# Patient Record
Sex: FEMALE | Race: WHITE | Employment: PART TIME | URBAN - METROPOLITAN AREA
[De-identification: names, ages, dates, MRNs, and addresses within clinical notes are randomized per-mention and may not be internally consistent; named-entity substitution may affect disease eponyms.]

---

## 2017-01-23 ENCOUNTER — OFFICE VISIT (OUTPATIENT)
Dept: FAMILY MEDICINE CLINIC | Age: 22
End: 2017-01-23

## 2017-01-23 VITALS
DIASTOLIC BLOOD PRESSURE: 70 MMHG | WEIGHT: 204.4 LBS | HEIGHT: 62 IN | TEMPERATURE: 97.2 F | BODY MASS INDEX: 37.61 KG/M2 | RESPIRATION RATE: 16 BRPM | HEART RATE: 68 BPM | SYSTOLIC BLOOD PRESSURE: 100 MMHG

## 2017-01-23 DIAGNOSIS — Z23 NEED FOR INFLUENZA VACCINATION: ICD-10-CM

## 2017-01-23 DIAGNOSIS — J03.90 TONSILLITIS: ICD-10-CM

## 2017-01-23 DIAGNOSIS — J01.01 ACUTE RECURRENT MAXILLARY SINUSITIS: Primary | ICD-10-CM

## 2017-01-23 PROCEDURE — G8419 CALC BMI OUT NRM PARAM NOF/U: HCPCS | Performed by: FAMILY MEDICINE

## 2017-01-23 PROCEDURE — G8427 DOCREV CUR MEDS BY ELIG CLIN: HCPCS | Performed by: FAMILY MEDICINE

## 2017-01-23 PROCEDURE — G8484 FLU IMMUNIZE NO ADMIN: HCPCS | Performed by: FAMILY MEDICINE

## 2017-01-23 PROCEDURE — 1036F TOBACCO NON-USER: CPT | Performed by: FAMILY MEDICINE

## 2017-01-23 PROCEDURE — 99213 OFFICE O/P EST LOW 20 MIN: CPT | Performed by: FAMILY MEDICINE

## 2017-01-23 RX ORDER — CEFDINIR 300 MG/1
600 CAPSULE ORAL DAILY
Qty: 20 CAPSULE | Refills: 0 | Status: SHIPPED | OUTPATIENT
Start: 2017-01-23 | End: 2017-02-02

## 2017-01-23 ASSESSMENT — ENCOUNTER SYMPTOMS
COUGH: 0
VOMITING: 0
EYES NEGATIVE: 1
NAUSEA: 0
DIARRHEA: 0
SHORTNESS OF BREATH: 1
CONSTIPATION: 0
ABDOMINAL PAIN: 0

## 2017-01-23 ASSESSMENT — PATIENT HEALTH QUESTIONNAIRE - PHQ9
2. FEELING DOWN, DEPRESSED OR HOPELESS: 0
1. LITTLE INTEREST OR PLEASURE IN DOING THINGS: 0
SUM OF ALL RESPONSES TO PHQ9 QUESTIONS 1 & 2: 0
SUM OF ALL RESPONSES TO PHQ QUESTIONS 1-9: 0

## 2017-04-06 ENCOUNTER — PROCEDURE VISIT (OUTPATIENT)
Dept: OBGYN | Age: 22
End: 2017-04-06

## 2017-04-06 VITALS
DIASTOLIC BLOOD PRESSURE: 78 MMHG | BODY MASS INDEX: 35.33 KG/M2 | HEIGHT: 62 IN | WEIGHT: 192 LBS | HEART RATE: 85 BPM | SYSTOLIC BLOOD PRESSURE: 116 MMHG

## 2017-04-06 DIAGNOSIS — Z11.3 ROUTINE SCREENING FOR STI (SEXUALLY TRANSMITTED INFECTION): ICD-10-CM

## 2017-04-06 DIAGNOSIS — N92.6 IRREGULAR PERIODS: Primary | ICD-10-CM

## 2017-04-06 DIAGNOSIS — Z30.431 IUD CHECK UP: ICD-10-CM

## 2017-04-06 PROCEDURE — 1036F TOBACCO NON-USER: CPT | Performed by: OBSTETRICS & GYNECOLOGY

## 2017-04-06 PROCEDURE — 58300 INSERT INTRAUTERINE DEVICE: CPT | Performed by: OBSTETRICS & GYNECOLOGY

## 2017-04-12 DIAGNOSIS — Z11.3 ROUTINE SCREENING FOR STI (SEXUALLY TRANSMITTED INFECTION): ICD-10-CM

## 2017-04-17 ENCOUNTER — TELEPHONE (OUTPATIENT)
Dept: OBGYN | Age: 22
End: 2017-04-17

## 2017-04-17 RX ORDER — AZITHROMYCIN 500 MG/1
TABLET, FILM COATED ORAL
Qty: 2 TABLET | Refills: 0 | Status: SHIPPED | OUTPATIENT
Start: 2017-04-17 | End: 2017-05-15

## 2017-04-19 ENCOUNTER — TELEPHONE (OUTPATIENT)
Dept: OBGYN | Age: 22
End: 2017-04-19

## 2017-04-19 ENCOUNTER — HOSPITAL ENCOUNTER (OUTPATIENT)
Dept: ULTRASOUND IMAGING | Age: 22
Discharge: HOME | End: 2017-04-19
Payer: COMMERCIAL

## 2017-04-19 DIAGNOSIS — Z30.431 IUD CHECK UP: ICD-10-CM

## 2017-04-19 PROCEDURE — 76856 US EXAM PELVIC COMPLETE: CPT

## 2017-04-19 PROCEDURE — 76830 TRANSVAGINAL US NON-OB: CPT

## 2017-04-27 ENCOUNTER — PROCEDURE VISIT (OUTPATIENT)
Dept: OBGYN | Age: 22
End: 2017-04-27

## 2017-04-27 VITALS
DIASTOLIC BLOOD PRESSURE: 72 MMHG | SYSTOLIC BLOOD PRESSURE: 124 MMHG | HEIGHT: 62 IN | BODY MASS INDEX: 35.88 KG/M2 | WEIGHT: 195 LBS

## 2017-04-27 DIAGNOSIS — Z20.2 STD EXPOSURE: ICD-10-CM

## 2017-04-27 DIAGNOSIS — Z30.432 ENCOUNTER FOR IUD REMOVAL: Primary | ICD-10-CM

## 2017-04-27 PROCEDURE — 99212 OFFICE O/P EST SF 10 MIN: CPT | Performed by: OBSTETRICS & GYNECOLOGY

## 2017-04-27 PROCEDURE — 1036F TOBACCO NON-USER: CPT | Performed by: OBSTETRICS & GYNECOLOGY

## 2017-04-27 PROCEDURE — 58301 REMOVE INTRAUTERINE DEVICE: CPT | Performed by: OBSTETRICS & GYNECOLOGY

## 2017-05-02 DIAGNOSIS — Z20.2 STD EXPOSURE: Primary | ICD-10-CM

## 2017-05-02 DIAGNOSIS — Z20.2 STD EXPOSURE: ICD-10-CM

## 2017-05-02 LAB
HAV IGM SER IA-ACNC: NORMAL
HEPATITIS B CORE IGM ANTIBODY: NORMAL
HEPATITIS B SURFACE ANTIGEN INTERPRETATION: NORMAL
HEPATITIS C ANTIBODY INTERPRETATION: NORMAL
HEPATITIS INTERPRETATION:: NORMAL

## 2017-05-03 LAB — RPR: NORMAL

## 2017-05-04 ENCOUNTER — TELEPHONE (OUTPATIENT)
Dept: OBGYN | Age: 22
End: 2017-05-04

## 2017-05-04 LAB
HERPES TYPE 1/2 IGM COMBINED: 0.48 IV
HERPES TYPE I/II IGG COMBINED: 0.3 IV
HIV-1 AND HIV-2 ANTIBODIES: NEGATIVE

## 2017-05-15 ENCOUNTER — OFFICE VISIT (OUTPATIENT)
Dept: OBGYN | Age: 22
End: 2017-05-15

## 2017-05-15 VITALS
DIASTOLIC BLOOD PRESSURE: 74 MMHG | HEIGHT: 62 IN | BODY MASS INDEX: 35.88 KG/M2 | WEIGHT: 195 LBS | SYSTOLIC BLOOD PRESSURE: 102 MMHG

## 2017-05-15 DIAGNOSIS — Z20.2 EXPOSURE TO STD: Primary | ICD-10-CM

## 2017-05-15 PROCEDURE — G8427 DOCREV CUR MEDS BY ELIG CLIN: HCPCS | Performed by: OBSTETRICS & GYNECOLOGY

## 2017-05-15 PROCEDURE — 99213 OFFICE O/P EST LOW 20 MIN: CPT | Performed by: OBSTETRICS & GYNECOLOGY

## 2017-05-15 PROCEDURE — G8417 CALC BMI ABV UP PARAM F/U: HCPCS | Performed by: OBSTETRICS & GYNECOLOGY

## 2017-05-15 PROCEDURE — 1036F TOBACCO NON-USER: CPT | Performed by: OBSTETRICS & GYNECOLOGY

## 2017-05-15 RX ORDER — SPIRONOLACTONE 25 MG/1
50 TABLET ORAL DAILY
Qty: 30 TABLET | Refills: 3 | Status: SHIPPED | OUTPATIENT
Start: 2017-05-15 | End: 2017-05-19 | Stop reason: SDUPTHER

## 2017-05-15 RX ORDER — METFORMIN HYDROCHLORIDE 500 MG/1
500 TABLET, FILM COATED, EXTENDED RELEASE ORAL 2 TIMES DAILY WITH MEALS
Qty: 60 TABLET | Refills: 3 | Status: SHIPPED | OUTPATIENT
Start: 2017-05-15 | End: 2017-05-19 | Stop reason: SDUPTHER

## 2017-05-17 ENCOUNTER — TELEPHONE (OUTPATIENT)
Dept: OBGYN | Age: 22
End: 2017-05-17

## 2017-05-18 ENCOUNTER — TELEPHONE (OUTPATIENT)
Dept: OBGYN | Age: 22
End: 2017-05-18

## 2017-05-19 ENCOUNTER — TELEPHONE (OUTPATIENT)
Dept: OBGYN | Age: 22
End: 2017-05-19

## 2017-05-19 RX ORDER — METFORMIN HYDROCHLORIDE 500 MG/1
500 TABLET, FILM COATED, EXTENDED RELEASE ORAL 2 TIMES DAILY WITH MEALS
Qty: 180 TABLET | Refills: 3 | Status: SHIPPED | OUTPATIENT
Start: 2017-05-19 | End: 2017-06-21 | Stop reason: ALTCHOICE

## 2017-05-19 RX ORDER — SPIRONOLACTONE 25 MG/1
50 TABLET ORAL DAILY
Qty: 180 TABLET | Refills: 3 | Status: SHIPPED | OUTPATIENT
Start: 2017-05-19 | End: 2018-05-28 | Stop reason: SDUPTHER

## 2017-05-19 RX ORDER — SPIRONOLACTONE 25 MG/1
50 TABLET ORAL DAILY
Qty: 90 TABLET | Refills: 3 | Status: SHIPPED | OUTPATIENT
Start: 2017-05-19 | End: 2017-06-20

## 2017-05-24 DIAGNOSIS — Z20.2 EXPOSURE TO STD: ICD-10-CM

## 2017-05-26 ENCOUNTER — TELEPHONE (OUTPATIENT)
Dept: OBGYN | Age: 22
End: 2017-05-26

## 2017-06-07 ENCOUNTER — TELEPHONE (OUTPATIENT)
Dept: OBGYN | Age: 22
End: 2017-06-07

## 2017-06-20 ENCOUNTER — TELEPHONE (OUTPATIENT)
Dept: OBGYN | Age: 22
End: 2017-06-20

## 2017-06-20 RX ORDER — METFORMIN HYDROCHLORIDE 500 MG/1
500 TABLET, EXTENDED RELEASE ORAL 2 TIMES DAILY WITH MEALS
Qty: 60 TABLET | Refills: 3 | Status: SHIPPED | OUTPATIENT
Start: 2017-06-20 | End: 2017-06-23 | Stop reason: SDUPTHER

## 2017-06-21 ENCOUNTER — OFFICE VISIT (OUTPATIENT)
Dept: FAMILY MEDICINE CLINIC | Age: 22
End: 2017-06-21

## 2017-06-21 VITALS
WEIGHT: 196 LBS | BODY MASS INDEX: 36.07 KG/M2 | HEIGHT: 62 IN | HEART RATE: 96 BPM | TEMPERATURE: 97 F | DIASTOLIC BLOOD PRESSURE: 82 MMHG | SYSTOLIC BLOOD PRESSURE: 110 MMHG

## 2017-06-21 DIAGNOSIS — H92.01 OTALGIA, RIGHT: Primary | ICD-10-CM

## 2017-06-21 DIAGNOSIS — H61.21 IMPACTED CERUMEN OF RIGHT EAR: ICD-10-CM

## 2017-06-21 PROCEDURE — G8417 CALC BMI ABV UP PARAM F/U: HCPCS | Performed by: FAMILY MEDICINE

## 2017-06-21 PROCEDURE — 1036F TOBACCO NON-USER: CPT | Performed by: FAMILY MEDICINE

## 2017-06-21 PROCEDURE — G8427 DOCREV CUR MEDS BY ELIG CLIN: HCPCS | Performed by: FAMILY MEDICINE

## 2017-06-21 PROCEDURE — 99213 OFFICE O/P EST LOW 20 MIN: CPT | Performed by: FAMILY MEDICINE

## 2017-06-26 RX ORDER — METFORMIN HYDROCHLORIDE 500 MG/1
500 TABLET, EXTENDED RELEASE ORAL 2 TIMES DAILY WITH MEALS
Qty: 180 TABLET | Refills: 3 | Status: SHIPPED | OUTPATIENT
Start: 2017-06-26 | End: 2018-04-13 | Stop reason: SDUPTHER

## 2017-08-14 ENCOUNTER — PROCEDURE VISIT (OUTPATIENT)
Dept: OBGYN | Age: 22
End: 2017-08-14

## 2017-08-14 VITALS
SYSTOLIC BLOOD PRESSURE: 108 MMHG | HEIGHT: 62 IN | BODY MASS INDEX: 35.33 KG/M2 | WEIGHT: 192 LBS | DIASTOLIC BLOOD PRESSURE: 80 MMHG

## 2017-08-14 DIAGNOSIS — Z30.431 IUD CHECK UP: ICD-10-CM

## 2017-08-14 DIAGNOSIS — N92.6 IRREGULAR PERIODS: ICD-10-CM

## 2017-08-14 DIAGNOSIS — Z30.430 ENCOUNTER FOR IUD INSERTION: Primary | ICD-10-CM

## 2017-08-14 LAB
CONTROL: NORMAL
PREGNANCY TEST URINE, POC: NEGATIVE

## 2017-08-14 PROCEDURE — 58300 INSERT INTRAUTERINE DEVICE: CPT | Performed by: OBSTETRICS & GYNECOLOGY

## 2017-08-14 PROCEDURE — 1036F TOBACCO NON-USER: CPT | Performed by: OBSTETRICS & GYNECOLOGY

## 2017-08-14 PROCEDURE — 81025 URINE PREGNANCY TEST: CPT | Performed by: OBSTETRICS & GYNECOLOGY

## 2017-09-25 ENCOUNTER — TELEPHONE (OUTPATIENT)
Dept: OBGYN | Age: 22
End: 2017-09-25

## 2017-09-27 ENCOUNTER — HOSPITAL ENCOUNTER (OUTPATIENT)
Dept: ULTRASOUND IMAGING | Age: 22
Discharge: HOME | End: 2017-09-27
Payer: COMMERCIAL

## 2017-09-27 DIAGNOSIS — Z30.431 IUD CHECK UP: ICD-10-CM

## 2017-09-27 PROCEDURE — 76856 US EXAM PELVIC COMPLETE: CPT

## 2017-09-27 PROCEDURE — 76830 TRANSVAGINAL US NON-OB: CPT

## 2017-10-04 ENCOUNTER — TELEPHONE (OUTPATIENT)
Dept: OBGYN | Age: 22
End: 2017-10-04

## 2017-10-04 DIAGNOSIS — Z30.431 IUD CHECK UP: Primary | ICD-10-CM

## 2017-10-04 NOTE — TELEPHONE ENCOUNTER
Patient calling for ultrasound results. Patient informed the doctor will review at her next appointment on 10/10/17.

## 2017-10-10 ENCOUNTER — OFFICE VISIT (OUTPATIENT)
Dept: OBGYN | Age: 22
End: 2017-10-10

## 2017-10-10 VITALS — WEIGHT: 201 LBS | BODY MASS INDEX: 36.99 KG/M2 | HEIGHT: 62 IN

## 2017-10-10 DIAGNOSIS — Z30.431 IUD CHECK UP: Primary | ICD-10-CM

## 2017-10-10 PROCEDURE — G8427 DOCREV CUR MEDS BY ELIG CLIN: HCPCS | Performed by: OBSTETRICS & GYNECOLOGY

## 2017-10-10 PROCEDURE — G8417 CALC BMI ABV UP PARAM F/U: HCPCS | Performed by: OBSTETRICS & GYNECOLOGY

## 2017-10-10 PROCEDURE — 99213 OFFICE O/P EST LOW 20 MIN: CPT | Performed by: OBSTETRICS & GYNECOLOGY

## 2017-10-10 PROCEDURE — 1036F TOBACCO NON-USER: CPT | Performed by: OBSTETRICS & GYNECOLOGY

## 2017-10-10 PROCEDURE — G8484 FLU IMMUNIZE NO ADMIN: HCPCS | Performed by: OBSTETRICS & GYNECOLOGY

## 2017-10-10 NOTE — PROGRESS NOTES
SUBJECTIVE:   24 y.o. Amy Frank  female here to discuss results. Pt without complaints today. Review of Systems:  General ROS: negative  Respiratory ROS: no cough, shortness of breath, or wheezing  Cardiovascular ROS: no chest pain or dyspnea on exertion  Gastrointestinal ROS: no abdominal pain, change in bowel habits, or black or bloody stools  Genito-Urinary ROS: no dysuria, trouble voiding, or hematuria    OBJECTIVE:   Ht 5' 2\" (1.575 m)   Wt 201 lb (91.2 kg)   LMP 09/09/2017   BMI 36.76 kg/m²     Past Medical History:   Diagnosis Date    Obesity     PCOS (polycystic ovarian syndrome)                                                                    No past surgical history on file. Family History   Problem Relation Age of Onset    Other Father     Cancer Maternal Grandfather      esophageal    Breast Cancer Maternal Aunt      Social History     Social History    Marital status: Single     Spouse name: N/A    Number of children: N/A    Years of education: N/A     Occupational History    Not on file. Social History Main Topics    Smoking status: Never Smoker    Smokeless tobacco: Never Used    Alcohol use Yes    Drug use: No    Sexual activity: Yes     Partners: Male     Other Topics Concern    Not on file     Social History Narrative    No narrative on file       Physical Exam:  GEN: She appears well, afebrile. HEENT: normal cephalic, atraumatic  CVS: regular rate and rhythm  ABDOMEN: benign, soft, nontender, no masses. MUSCULOSKELETAL: normal gait, no masses  SKIN: normal texture and tone, no lesions    ASSESSMENT:   1. IUD check up     2. IUD is in the lower uterine segment   3. COUNSELED ON POTENTIAL DECR EFFECTIVENESS OF IUD  4. RTO FOR REMOVAL AT HER DESIRE/PRECAUTIONS GIVEN    PLAN:   Risks, benefits and alternative therapies for treatment discussed. Pt elects to have keep IUD in place at this time.  Will return to office in 1 month, will call back if she chooses a different

## 2017-10-25 ENCOUNTER — OFFICE VISIT (OUTPATIENT)
Dept: FAMILY MEDICINE CLINIC | Age: 22
End: 2017-10-25

## 2017-10-25 VITALS
RESPIRATION RATE: 12 BRPM | OXYGEN SATURATION: 96 % | SYSTOLIC BLOOD PRESSURE: 104 MMHG | TEMPERATURE: 96.4 F | WEIGHT: 197.19 LBS | DIASTOLIC BLOOD PRESSURE: 80 MMHG | HEIGHT: 62 IN | BODY MASS INDEX: 36.29 KG/M2 | HEART RATE: 82 BPM

## 2017-10-25 DIAGNOSIS — E28.2 PCOD (POLYCYSTIC OVARIAN DISEASE): Primary | ICD-10-CM

## 2017-10-25 DIAGNOSIS — N83.209 CYST OF OVARY, UNSPECIFIED LATERALITY: ICD-10-CM

## 2017-10-25 DIAGNOSIS — E66.9 CLASS 2 OBESITY WITH BODY MASS INDEX (BMI) OF 36.0 TO 36.9 IN ADULT, UNSPECIFIED OBESITY TYPE, UNSPECIFIED WHETHER SERIOUS COMORBIDITY PRESENT: ICD-10-CM

## 2017-10-25 PROCEDURE — 99213 OFFICE O/P EST LOW 20 MIN: CPT | Performed by: FAMILY MEDICINE

## 2017-10-25 PROCEDURE — 1036F TOBACCO NON-USER: CPT | Performed by: FAMILY MEDICINE

## 2017-10-25 PROCEDURE — G8417 CALC BMI ABV UP PARAM F/U: HCPCS | Performed by: FAMILY MEDICINE

## 2017-10-25 PROCEDURE — G8427 DOCREV CUR MEDS BY ELIG CLIN: HCPCS | Performed by: FAMILY MEDICINE

## 2017-10-25 PROCEDURE — G8484 FLU IMMUNIZE NO ADMIN: HCPCS | Performed by: FAMILY MEDICINE

## 2017-10-25 RX ORDER — PHENTERMINE HYDROCHLORIDE 37.5 MG/1
37.5 CAPSULE ORAL EVERY MORNING
Qty: 30 CAPSULE | Refills: 0 | Status: SHIPPED | OUTPATIENT
Start: 2017-10-25 | End: 2017-11-24 | Stop reason: SDUPTHER

## 2017-10-25 NOTE — PROGRESS NOTES
Subjective:      Patient ID: Luis Daniel Devine is a 24 y.o. female. Chief Complaint   Patient presents with    Obesity     Would like to lose about 50 pounds. Has never been able to lose more than 10 pounds. Has tried exercising and self directed dieting / calorie counting without success.  Ovarian Cyst       HPI  She is here today follow-up on her ovarian cysts in her weight gain      She is currently taking the metformin and Aldactone from Dr. Brittanie Calvillo which really helped her hair loss      But her periods are still at times painful has tried taking birth control pills but at times forgets is going to be looking at new implant that may work very well for weight loss is also key to her problem persists and discussed options with her she has been really trying stable from all sugary drinks and less carbohydrates                  Wt Readings from Last 3 Encounters:   10/25/17 197 lb 3 oz (89.4 kg)   10/10/17 201 lb (91.2 kg)   08/14/17 192 lb (87.1 kg)     Body mass index is 36.07 kg/m². No Known Allergies  Outpatient Encounter Prescriptions as of 10/25/2017   Medication Sig Dispense Refill    phentermine (ADIPEX-P) 37.5 MG capsule Take 1 capsule by mouth every morning 30 capsule 0    levonorgestrel (MIRENA, 52 MG,) IUD 52 mg 1 each by Intrauterine route once      metFORMIN (GLUCOPHAGE XR) 500 MG extended release tablet Take 1 tablet by mouth 2 times daily (with meals) 180 tablet 3    spironolactone (ALDACTONE) 25 MG tablet Take 2 tablets by mouth daily 180 tablet 3     Facility-Administered Encounter Medications as of 10/25/2017   Medication Dose Route Frequency Provider Last Rate Last Dose    Levonorgestrel [facility]) IUD 19.5 mg 1 each  1 each Intrauterine Once [address] [facility], [place]   1 each at 04/07/17 8084     Social History     Social History    Marital status: Single     Spouse name: N/A    Number of children: N/A    Years of education: N/A     Occupational History    Not on file.      Social comorbidity present     3. Cyst of ovary, unspecified laterality               Plan:        Orders Placed This Encounter   Medications    phentermine (ADIPEX-P) 37.5 MG capsule     Sig: Take 1 capsule by mouth every morning     Dispense:  30 capsule     Refill:  0     No orders of the defined types were placed in this encounter.           Health Maintenance Due   Topic Date Due    Chlamydia screen  11/01/2010    Flu vaccine (1) 09/01/2017        so we discussed Adipex-P and comes in next time we'll do an EKG    See us in 4 weeks  Controlled Substances Monitoring:              If anything worsens or changes please call us at once , follow-up in the office as planned,

## 2017-11-20 ENCOUNTER — OFFICE VISIT (OUTPATIENT)
Dept: OBGYN | Age: 22
End: 2017-11-20

## 2017-11-20 VITALS
WEIGHT: 187 LBS | BODY MASS INDEX: 34.41 KG/M2 | DIASTOLIC BLOOD PRESSURE: 76 MMHG | SYSTOLIC BLOOD PRESSURE: 112 MMHG | HEIGHT: 62 IN

## 2017-11-20 DIAGNOSIS — Z30.432 ENCOUNTER FOR IUD REMOVAL: Primary | ICD-10-CM

## 2017-11-20 PROCEDURE — 58301 REMOVE INTRAUTERINE DEVICE: CPT | Performed by: OBSTETRICS & GYNECOLOGY

## 2017-11-20 NOTE — PROGRESS NOTES
IUD Removal and Insert:   Zuri Chandler is a 20 y.o. female with  here for IUD removal Pt with negative UPT today. Procedure 1:  Mirena IUD strings identified as external OS and grasped securely as cephalad as possible with ring forceps. With consistent firm pull the IUD is removed easily intact and without difficulty. Patient tolerated procedure well. Complications: none    Pt to RTO for follow up     IMoon, am scribing for, and in the presence of, Dr Isaura Hassan. Electronically signed by: Moon Burgos 17 1:12 PM        I, Dr Isaura Hassan, personally performed the services described in this documentation, as scribed by Moon Burgos in my presence, and it is both accurate and complete.  Electronically signed by: Isaura Hassan MD 17 4:41 AM

## 2017-11-24 ENCOUNTER — OFFICE VISIT (OUTPATIENT)
Dept: FAMILY MEDICINE CLINIC | Age: 22
End: 2017-11-24

## 2017-11-24 VITALS
RESPIRATION RATE: 12 BRPM | TEMPERATURE: 97.2 F | HEART RATE: 117 BPM | OXYGEN SATURATION: 99 % | BODY MASS INDEX: 34.15 KG/M2 | SYSTOLIC BLOOD PRESSURE: 100 MMHG | HEIGHT: 62 IN | WEIGHT: 185.56 LBS | DIASTOLIC BLOOD PRESSURE: 78 MMHG

## 2017-11-24 DIAGNOSIS — K29.00 OTHER ACUTE GASTRITIS WITHOUT HEMORRHAGE: Primary | ICD-10-CM

## 2017-11-24 DIAGNOSIS — R11.0 NAUSEA: ICD-10-CM

## 2017-11-24 PROCEDURE — 99213 OFFICE O/P EST LOW 20 MIN: CPT | Performed by: FAMILY MEDICINE

## 2017-11-24 PROCEDURE — G8417 CALC BMI ABV UP PARAM F/U: HCPCS | Performed by: FAMILY MEDICINE

## 2017-11-24 PROCEDURE — 1036F TOBACCO NON-USER: CPT | Performed by: FAMILY MEDICINE

## 2017-11-24 PROCEDURE — G8427 DOCREV CUR MEDS BY ELIG CLIN: HCPCS | Performed by: FAMILY MEDICINE

## 2017-11-24 PROCEDURE — G8484 FLU IMMUNIZE NO ADMIN: HCPCS | Performed by: FAMILY MEDICINE

## 2017-11-24 RX ORDER — PHENTERMINE HYDROCHLORIDE 37.5 MG/1
37.5 CAPSULE ORAL EVERY MORNING
Qty: 30 CAPSULE | Refills: 0 | Status: SHIPPED | OUTPATIENT
Start: 2017-11-24 | End: 2017-12-15 | Stop reason: SDUPTHER

## 2017-11-24 NOTE — PROGRESS NOTES
Subjective:      Patient ID: Jailene Zahng is a 20 y.o. female. Chief Complaint   Patient presents with    Obesity     Has been on Adipex for 1 month. Has been watching her diet, but has not been exercising.  Emesis     since last night. Has had 12-15 episodes. Started off as contents of her stomach, but has changed to bile. no other symptoms.         HPI    Here today follow-up on her Adipex doing well without no racing heart no shortness breath or chest pain      But unfortunately she's had 12 capsules onset of vomiting no diarrhea she needs to much with the holiday but started getting a bile taste she had no other symptoms at that time      Did take some Pepto-Bismol Zofran that mom had at home and call things down for her      The pain did not read to the right upper or right lower quadrant discomfort and in general    Wt Readings from Last 3 Encounters:   11/24/17 185 lb 9 oz (84.2 kg)   11/20/17 187 lb (84.8 kg)   10/25/17 197 lb 3 oz (89.4 kg)       No Known Allergies  Outpatient Encounter Prescriptions as of 11/24/2017   Medication Sig Dispense Refill    phentermine (ADIPEX-P) 37.5 MG capsule Take 1 capsule by mouth every morning  BMI 33.94. 30 capsule 0    metFORMIN (GLUCOPHAGE XR) 500 MG extended release tablet Take 1 tablet by mouth 2 times daily (with meals) 180 tablet 3    spironolactone (ALDACTONE) 25 MG tablet Take 2 tablets by mouth daily 180 tablet 3    [DISCONTINUED] phentermine (ADIPEX-P) 37.5 MG capsule Take 1 capsule by mouth every morning 30 capsule 0    [DISCONTINUED] levonorgestrel (MIRENA, 52 MG,) IUD 52 mg 1 each by Intrauterine route once       Facility-Administered Encounter Medications as of 11/24/2017   Medication Dose Route Frequency Provider Last Rate Last Dose    Levonorgestrel [facility]) IUD 19.5 mg 1 each  1 each Intrauterine Once Tarsha Sam MD   1 each at 04/07/17 4040     Social History     Social History    Marital status: Single     Spouse name: N/A    Number of children: N/A    Years of education: N/A     Occupational History    Not on file. Social History Main Topics    Smoking status: Never Smoker    Smokeless tobacco: Never Used    Alcohol use Yes    Drug use: No    Sexual activity: Yes     Partners: Male     Other Topics Concern    Not on file     Social History Narrative    No narrative on file     Family History   Problem Relation Age of Onset    Other Father     Cancer Maternal Grandfather      esophageal    Breast Cancer Maternal Aunt      Past Medical History:   Diagnosis Date    Obesity     PCOS (polycystic ovarian syndrome)      No past surgical history on file. REVIEW OF SYSTEMS:   REVIEW OF SYSTEMS:   Patient seen today for exam.  Denies any problems with hearing, headaches or vision. Denies any shortness of breath, chest pain, nausea or vomiting. No black stool, no blood in the stool. No heartburn. Denies any problems with constipation or diarrhea either. No dysuria type symptoms. Objective:     /78 (Site: Left Arm, Position: Sitting, Cuff Size: Medium Adult)   Pulse 117   Temp 97.2 °F (36.2 °C) (Temporal)   Resp 12   Ht 5' 2\" (1.575 m)   Wt 185 lb 9 oz (84.2 kg)   SpO2 99%   BMI 33.94 kg/m²     Physical Exam      O:  Alert and active female in no acute distress  HEENT:  TMs clear. Pharynx neg. Nares clear, no drainage noted  Neck supple/ no adenopathy   HEART:  RRR without murmur/ no carotid bruits  LUNGS:  Clear to auscultation bilaterally, no wheeze or rhonchi noted  THYROID: neg masses or nodularity  ABDOMEN:  Soft x4. Bowel sounds positive. No masses or organomegaly,  Negative tenderness, guarding or rebound. EXTR:  Without edema./ good pulses bilat    Neurologic exam unremarkable. DTRs in upper and lower extremities within normal limits. Full strength noted    Skin- no lesions noted       Assessment:      1. Other acute gastritis without hemorrhage     2.  Nausea               Plan:

## 2017-11-29 ENCOUNTER — TELEPHONE (OUTPATIENT)
Dept: OBGYN | Age: 22
End: 2017-11-29

## 2017-12-13 ENCOUNTER — TELEPHONE (OUTPATIENT)
Dept: OBGYN | Age: 22
End: 2017-12-13

## 2017-12-13 NOTE — TELEPHONE ENCOUNTER
Called pt,no answer,left message on voicemail to call back. Pt may be made aware that able to schedule an appointment with Dr Zelaya tomorrow at 2:45 or 3pm. Thank You

## 2017-12-13 NOTE — TELEPHONE ENCOUNTER
Spoke with patient's mother Breanne Veronica, pts mother very upset that we have no availability. I offered patient appointment for tomorrow at [phone] on Dr. Cayden Uribe, patient mother declined this. Patient said we need to see her either Thurdays, Friday or Saturday. Explained to pts mother we have no office hours Saturday, doctor Joaquina Calle is in surgery today and Friday, offered her next Tuesday with Dr. Jeffrey Yang, pt declined states they will be in [facility]. Pt then said she would like to speak to Dr. Jeffrey Yang explained I will forward message.

## 2017-12-14 ENCOUNTER — PROCEDURE VISIT (OUTPATIENT)
Dept: OBGYN | Age: 22
End: 2017-12-14

## 2017-12-14 VITALS
BODY MASS INDEX: 33.88 KG/M2 | WEIGHT: 184.1 LBS | SYSTOLIC BLOOD PRESSURE: 98 MMHG | DIASTOLIC BLOOD PRESSURE: 74 MMHG | HEIGHT: 62 IN

## 2017-12-14 DIAGNOSIS — N92.6 IRREGULAR MENSES: Primary | ICD-10-CM

## 2017-12-14 LAB
CONTROL: NORMAL
PREGNANCY TEST URINE, POC: NEGATIVE

## 2017-12-14 PROCEDURE — 99999 PR OFFICE/OUTPT VISIT,PROCEDURE ONLY: CPT | Performed by: OBSTETRICS & GYNECOLOGY

## 2017-12-14 PROCEDURE — 11981 INSERTION DRUG DLVR IMPLANT: CPT | Performed by: OBSTETRICS & GYNECOLOGY

## 2017-12-14 PROCEDURE — 81025 URINE PREGNANCY TEST: CPT | Performed by: OBSTETRICS & GYNECOLOGY

## 2017-12-15 ENCOUNTER — OFFICE VISIT (OUTPATIENT)
Dept: FAMILY MEDICINE CLINIC | Age: 22
End: 2017-12-15

## 2017-12-15 VITALS
TEMPERATURE: 97.1 F | BODY MASS INDEX: 33.56 KG/M2 | RESPIRATION RATE: 12 BRPM | DIASTOLIC BLOOD PRESSURE: 80 MMHG | SYSTOLIC BLOOD PRESSURE: 102 MMHG | HEART RATE: 102 BPM | HEIGHT: 62 IN | OXYGEN SATURATION: 99 % | WEIGHT: 182.38 LBS

## 2017-12-15 DIAGNOSIS — E28.2 PCOD (POLYCYSTIC OVARIAN DISEASE): Primary | ICD-10-CM

## 2017-12-15 PROCEDURE — G8427 DOCREV CUR MEDS BY ELIG CLIN: HCPCS | Performed by: FAMILY MEDICINE

## 2017-12-15 PROCEDURE — 1036F TOBACCO NON-USER: CPT | Performed by: FAMILY MEDICINE

## 2017-12-15 PROCEDURE — G8417 CALC BMI ABV UP PARAM F/U: HCPCS | Performed by: FAMILY MEDICINE

## 2017-12-15 PROCEDURE — G8484 FLU IMMUNIZE NO ADMIN: HCPCS | Performed by: FAMILY MEDICINE

## 2017-12-15 PROCEDURE — 99213 OFFICE O/P EST LOW 20 MIN: CPT | Performed by: FAMILY MEDICINE

## 2017-12-15 RX ORDER — PHENTERMINE HYDROCHLORIDE 37.5 MG/1
37.5 CAPSULE ORAL EVERY MORNING
Qty: 30 CAPSULE | Refills: 0 | Status: SHIPPED | OUTPATIENT
Start: 2017-12-24 | End: 2018-01-23

## 2017-12-15 NOTE — PROGRESS NOTES
Extremities ; [place] use alcohol or drug mucosa positive pressure #Subjective:      Patient ID: Soumya Case is a 20 y.o. female. Chief Complaint   Patient presents with    Weight Management     Has been on Adipex for 2 months. Has also been trying to watch her diet. Has not exercised very much for the past few months. Her visit this month is 10 days early due to going on vacation next week    Cyst       HPI  Seen today for follow-up on her Adipex-P doing pretty well no shortness breath chest pain nausea vomiting seem to be tolerating medication well        Also her sister doing pretty well taking her metformin     seem to be doing okay just got Norplant implant in      Wt Readings from Last 3 Encounters:   12/15/17 182 lb 6 oz (82.7 kg)   12/14/17 184 lb 1.6 oz (83.5 kg)   11/24/17 185 lb 9 oz (84.2 kg)       No Known Allergies  Outpatient Encounter Prescriptions as of 12/15/2017   Medication Sig Dispense Refill    [START ON 12/24/2017] phentermine (ADIPEX-P) 37.5 MG capsule Take 1 capsule by mouth every morning  BMI 35.36. 30 capsule 0    metFORMIN (GLUCOPHAGE XR) 500 MG extended release tablet Take 1 tablet by mouth 2 times daily (with meals) 180 tablet 3    spironolactone (ALDACTONE) 25 MG tablet Take 2 tablets by mouth daily 180 tablet 3    [DISCONTINUED] phentermine (ADIPEX-P) 37.5 MG capsule Take 1 capsule by mouth every morning  BMI 33.94. 30 capsule 0     Facility-Administered Encounter Medications as of 12/15/2017   Medication Dose Route Frequency Provider Last Rate Last Dose    etonogestrel (NEXPLANON) implant 68 mg  68 mg Subdermal Once Meghann Khalil MD   68 mg at 12/14/17 1049    Levonorgestrel [facility]) IUD 19.5 mg 1 each  1 each Intrauterine Once Deepa Hernandez MD   1 each at 04/07/17 0665     Social History     Social History    Marital status: Single     Spouse name: N/A    Number of children: N/A    Years of education: N/A     Occupational History    Not on file.      Social

## 2018-03-15 ENCOUNTER — OFFICE VISIT (OUTPATIENT)
Dept: OBGYN CLINIC | Age: 23
End: 2018-03-15
Payer: COMMERCIAL

## 2018-03-15 VITALS
SYSTOLIC BLOOD PRESSURE: 118 MMHG | WEIGHT: 185 LBS | HEIGHT: 62 IN | BODY MASS INDEX: 34.04 KG/M2 | DIASTOLIC BLOOD PRESSURE: 84 MMHG

## 2018-03-15 DIAGNOSIS — Z30.46 ENCOUNTER FOR SURVEILLANCE OF NEXPLANON SUBDERMAL CONTRACEPTIVE: Primary | ICD-10-CM

## 2018-03-15 DIAGNOSIS — N92.6 IRREGULAR PERIODS: ICD-10-CM

## 2018-03-15 DIAGNOSIS — Z87.42 HISTORY OF PCOS: ICD-10-CM

## 2018-03-15 PROCEDURE — G8484 FLU IMMUNIZE NO ADMIN: HCPCS | Performed by: OBSTETRICS & GYNECOLOGY

## 2018-03-15 PROCEDURE — G8417 CALC BMI ABV UP PARAM F/U: HCPCS | Performed by: OBSTETRICS & GYNECOLOGY

## 2018-03-15 PROCEDURE — 1036F TOBACCO NON-USER: CPT | Performed by: OBSTETRICS & GYNECOLOGY

## 2018-03-15 PROCEDURE — G8427 DOCREV CUR MEDS BY ELIG CLIN: HCPCS | Performed by: OBSTETRICS & GYNECOLOGY

## 2018-03-15 PROCEDURE — 99212 OFFICE O/P EST SF 10 MIN: CPT | Performed by: OBSTETRICS & GYNECOLOGY

## 2018-03-15 RX ORDER — VALACYCLOVIR HYDROCHLORIDE 500 MG/1
TABLET, FILM COATED ORAL
Refills: 6 | COMMUNITY
Start: 2018-01-01 | End: 2018-08-01 | Stop reason: SDUPTHER

## 2018-03-19 ASSESSMENT — ENCOUNTER SYMPTOMS
ABDOMINAL PAIN: 0
CONSTIPATION: 0
DIARRHEA: 0
COUGH: 0
WHEEZING: 0
BLOOD IN STOOL: 0
SHORTNESS OF BREATH: 0

## 2018-03-19 NOTE — PROGRESS NOTES
behavior is normal. Judgment and thought content normal.         ASSESSMENT:      20 y.o. Annual  1. Encounter for surveillance of Nexplanon subdermal contraceptive     2. Irregular periods     3. History of PCOS  Marin Arreguin MD                    Counseling Completed:          PLAN:      Orders Placed This Encounter   Procedures   Marin Arreguin MD     Referral Priority:   Routine     Referral Type:   Consult for Advice and Opinion     Referral Reason:   Specialty Services Required     Referred to Provider:   Vaibhav Fuller MD     Requested Specialty:   Endocrinology     Number of Visits Requested:   1                   I, Lonie Hatchet, am scribing for, and in the presence of, Dr Edd Hickman. Electronically signed by: Dr Edd Jones, personally performed the services described in this documentation, as scribed by Lonie Hatchet in my presence, and it is both accurate and complete.  Electronically signed by: Edd Hickman MD

## 2018-04-13 ENCOUNTER — OFFICE VISIT (OUTPATIENT)
Dept: ENDOCRINOLOGY | Age: 23
End: 2018-04-13
Payer: COMMERCIAL

## 2018-04-13 VITALS
DIASTOLIC BLOOD PRESSURE: 80 MMHG | WEIGHT: 184 LBS | SYSTOLIC BLOOD PRESSURE: 112 MMHG | HEIGHT: 62 IN | HEART RATE: 74 BPM | BODY MASS INDEX: 33.86 KG/M2

## 2018-04-13 DIAGNOSIS — E28.2 PCOD (POLYCYSTIC OVARIAN DISEASE): Primary | ICD-10-CM

## 2018-04-13 DIAGNOSIS — E66.9 OBESITY (BMI 30-39.9): ICD-10-CM

## 2018-04-13 DIAGNOSIS — J32.9 SINUSITIS, UNSPECIFIED CHRONICITY, UNSPECIFIED LOCATION: ICD-10-CM

## 2018-04-13 DIAGNOSIS — E28.2 PCOD (POLYCYSTIC OVARIAN DISEASE): ICD-10-CM

## 2018-04-13 DIAGNOSIS — R63.5 WEIGHT GAIN: ICD-10-CM

## 2018-04-13 LAB
ANION GAP SERPL CALCULATED.3IONS-SCNC: 16 MEQ/L (ref 7–13)
BUN BLDV-MCNC: 11 MG/DL (ref 6–20)
CALCIUM SERPL-MCNC: 9.5 MG/DL (ref 8.6–10.2)
CHLORIDE BLD-SCNC: 101 MEQ/L (ref 98–107)
CO2: 25 MEQ/L (ref 22–29)
CREAT SERPL-MCNC: 0.56 MG/DL (ref 0.5–0.9)
GFR AFRICAN AMERICAN: >60
GFR NON-AFRICAN AMERICAN: >60
GLUCOSE BLD-MCNC: 75 MG/DL (ref 74–109)
HBA1C MFR BLD: 4.8 % (ref 4.8–5.9)
POTASSIUM SERPL-SCNC: 4.2 MEQ/L (ref 3.5–5.1)
SODIUM BLD-SCNC: 142 MEQ/L (ref 132–144)
T4 FREE: 1.22 NG/DL (ref 0.93–1.7)
TSH SERPL DL<=0.05 MIU/L-ACNC: 2.27 UIU/ML (ref 0.27–4.2)

## 2018-04-13 PROCEDURE — 1036F TOBACCO NON-USER: CPT | Performed by: INTERNAL MEDICINE

## 2018-04-13 PROCEDURE — G8427 DOCREV CUR MEDS BY ELIG CLIN: HCPCS | Performed by: INTERNAL MEDICINE

## 2018-04-13 PROCEDURE — G8417 CALC BMI ABV UP PARAM F/U: HCPCS | Performed by: INTERNAL MEDICINE

## 2018-04-13 PROCEDURE — 99203 OFFICE O/P NEW LOW 30 MIN: CPT | Performed by: INTERNAL MEDICINE

## 2018-04-13 RX ORDER — METFORMIN HYDROCHLORIDE 500 MG/1
TABLET, EXTENDED RELEASE ORAL
Qty: 120 TABLET | Refills: 3 | Status: SHIPPED | OUTPATIENT
Start: 2018-04-13 | End: 2018-08-31 | Stop reason: SDUPTHER

## 2018-04-13 RX ORDER — AZITHROMYCIN 250 MG/1
TABLET, FILM COATED ORAL
Qty: 1 PACKET | Refills: 0 | Status: SHIPPED | OUTPATIENT
Start: 2018-04-13 | End: 2018-04-23

## 2018-04-18 LAB — THYROID PEROXIDASE (TPO) ABS: <10 IU/ML (ref 0–35)

## 2018-04-21 PROBLEM — E66.9 OBESITY: Status: ACTIVE | Noted: 2018-04-21

## 2018-04-21 ASSESSMENT — ENCOUNTER SYMPTOMS
SINUS PAIN: 1
SINUS PRESSURE: 1

## 2018-05-09 ENCOUNTER — OFFICE VISIT (OUTPATIENT)
Dept: FAMILY MEDICINE CLINIC | Age: 23
End: 2018-05-09
Payer: COMMERCIAL

## 2018-05-09 VITALS
RESPIRATION RATE: 12 BRPM | HEIGHT: 62 IN | SYSTOLIC BLOOD PRESSURE: 110 MMHG | DIASTOLIC BLOOD PRESSURE: 70 MMHG | WEIGHT: 187.31 LBS | TEMPERATURE: 96.3 F | OXYGEN SATURATION: 98 % | HEART RATE: 84 BPM | BODY MASS INDEX: 34.47 KG/M2

## 2018-05-09 DIAGNOSIS — J03.91 RECURRENT TONSILLITIS: ICD-10-CM

## 2018-05-09 DIAGNOSIS — J03.01 ACUTE RECURRENT STREPTOCOCCAL TONSILLITIS: Primary | ICD-10-CM

## 2018-05-09 PROCEDURE — 99213 OFFICE O/P EST LOW 20 MIN: CPT | Performed by: FAMILY MEDICINE

## 2018-05-09 PROCEDURE — 1036F TOBACCO NON-USER: CPT | Performed by: FAMILY MEDICINE

## 2018-05-09 PROCEDURE — G8427 DOCREV CUR MEDS BY ELIG CLIN: HCPCS | Performed by: FAMILY MEDICINE

## 2018-05-09 PROCEDURE — G8417 CALC BMI ABV UP PARAM F/U: HCPCS | Performed by: FAMILY MEDICINE

## 2018-05-09 RX ORDER — METHYLPREDNISOLONE 4 MG/1
TABLET ORAL
Qty: 1 KIT | Refills: 0 | Status: SHIPPED | OUTPATIENT
Start: 2018-05-09 | End: 2018-05-15

## 2018-05-09 RX ORDER — CEFDINIR 300 MG/1
600 CAPSULE ORAL DAILY
Qty: 20 CAPSULE | Refills: 0 | Status: SHIPPED | OUTPATIENT
Start: 2018-05-09 | End: 2018-05-19

## 2018-05-09 ASSESSMENT — PATIENT HEALTH QUESTIONNAIRE - PHQ9
SUM OF ALL RESPONSES TO PHQ QUESTIONS 1-9: 0
2. FEELING DOWN, DEPRESSED OR HOPELESS: 0
1. LITTLE INTEREST OR PLEASURE IN DOING THINGS: 0
SUM OF ALL RESPONSES TO PHQ9 QUESTIONS 1 & 2: 0

## 2018-05-30 RX ORDER — SPIRONOLACTONE 25 MG/1
TABLET ORAL
Qty: 180 TABLET | Refills: 3 | Status: SHIPPED | OUTPATIENT
Start: 2018-05-30 | End: 2018-08-31 | Stop reason: SDUPTHER

## 2018-08-01 ENCOUNTER — OFFICE VISIT (OUTPATIENT)
Dept: FAMILY MEDICINE CLINIC | Age: 23
End: 2018-08-01
Payer: COMMERCIAL

## 2018-08-01 VITALS
RESPIRATION RATE: 20 BRPM | HEIGHT: 62 IN | BODY MASS INDEX: 37.17 KG/M2 | SYSTOLIC BLOOD PRESSURE: 114 MMHG | DIASTOLIC BLOOD PRESSURE: 72 MMHG | HEART RATE: 80 BPM | WEIGHT: 202 LBS | TEMPERATURE: 97.7 F

## 2018-08-01 DIAGNOSIS — E28.2 PCOS (POLYCYSTIC OVARIAN SYNDROME): Primary | ICD-10-CM

## 2018-08-01 DIAGNOSIS — R63.4 WEIGHT LOSS: ICD-10-CM

## 2018-08-01 PROCEDURE — 99212 OFFICE O/P EST SF 10 MIN: CPT | Performed by: FAMILY MEDICINE

## 2018-08-01 RX ORDER — PHENTERMINE HYDROCHLORIDE 37.5 MG/1
37.5 TABLET ORAL
Qty: 30 TABLET | Refills: 0 | Status: SHIPPED | OUTPATIENT
Start: 2018-08-01 | End: 2018-08-31 | Stop reason: SDUPTHER

## 2018-08-01 RX ORDER — VALACYCLOVIR HYDROCHLORIDE 500 MG/1
TABLET, FILM COATED ORAL
Qty: 8 TABLET | Refills: 5 | Status: SHIPPED | OUTPATIENT
Start: 2018-08-01 | End: 2018-08-31 | Stop reason: SDUPTHER

## 2018-08-01 ASSESSMENT — ENCOUNTER SYMPTOMS
SHORTNESS OF BREATH: 0
SORE THROAT: 0
DIARRHEA: 0
COUGH: 0
CONSTIPATION: 0
SINUS PRESSURE: 0
ABDOMINAL PAIN: 0
EYE ITCHING: 0
EYE DISCHARGE: 0

## 2018-08-01 NOTE — PROGRESS NOTES
 Breast Cancer Maternal Aunt      Past Medical History:   Diagnosis Date    Obesity     PCOS (polycystic ovarian syndrome)      No past surgical history on file. There were no vitals taken for this visit. No Known Allergies  Outpatient Encounter Prescriptions as of 8/1/2018   Medication Sig Dispense Refill    spironolactone (ALDACTONE) 25 MG tablet TAKE 2 TABLETS DAILY 180 tablet 3    metFORMIN (GLUCOPHAGE XR) 500 MG extended release tablet 2 po bid 120 tablet 3    valACYclovir (VALTREX) 500 MG tablet TAKE 4 TABLETS BY MOUTH AT THE FIRST SIGN OF ATTACK  THEN TAKE 4 TABLETS 12 HOURS LATER  6     Facility-Administered Encounter Medications as of 8/1/2018   Medication Dose Route Frequency Provider Last Rate Last Dose    etonogestrel (NEXPLANON) implant 68 mg  68 mg Subdermal Once Daniela Vieira MD   68 mg at 12/14/17 1049    Levonorgestrel [facility]) IUD 19.5 mg 1 each  1 each Intrauterine Once Erica Diaz MD   1 each at 04/07/17 7800     Social History     Social History    Marital status: Single     Spouse name: N/A    Number of children: N/A    Years of education: N/A     Occupational History    Not on file. Social History Main Topics    Smoking status: Never Smoker    Smokeless tobacco: Never Used    Alcohol use Yes    Drug use: No    Sexual activity: Yes     Partners: Male     Other Topics Concern    Not on file     Social History Narrative    No narrative on file     Family History   Problem Relation Age of Onset    Other Father     Cancer Maternal Grandfather         esophageal    Breast Cancer Maternal Aunt      Past Medical History:   Diagnosis Date    Obesity     PCOS (polycystic ovarian syndrome)      No past surgical history on file. Past Medical History:   Diagnosis Date    Obesity     PCOS (polycystic ovarian syndrome)      No past surgical history on file.   Social History     Social History    Marital status: Single     Spouse name: N/A    Number of children: N/A    Years of education: N/A     Occupational History    Not on file. Social History Main Topics    Smoking status: Never Smoker    Smokeless tobacco: Never Used    Alcohol use Yes    Drug use: No    Sexual activity: Yes     Partners: Male     Other Topics Concern    Not on file     Social History Narrative    No narrative on file     Family History   Problem Relation Age of Onset    Other Father     Cancer Maternal Grandfather         esophageal    Breast Cancer Maternal Aunt      No Known Allergies  Current Outpatient Prescriptions   Medication Sig Dispense Refill    spironolactone (ALDACTONE) 25 MG tablet TAKE 2 TABLETS DAILY 180 tablet 3    metFORMIN (GLUCOPHAGE XR) 500 MG extended release tablet 2 po bid 120 tablet 3    valACYclovir (VALTREX) 500 MG tablet TAKE 4 TABLETS BY MOUTH AT THE FIRST SIGN OF ATTACK  THEN TAKE 4 TABLETS 12 HOURS LATER  6     Current Facility-Administered Medications   Medication Dose Route Frequency Provider Last Rate Last Dose    etonogestrel (NEXPLANON) implant 68 mg  68 mg Subdermal Once Kehinde Nixon MD   68 mg at 12/14/17 1049    Levonorgestrel [facility]) IUD 19.5 mg 1 each  1 each Intrauterine Once Layla Smalls MD   1 each at 04/07/17 0853       Objective    There were no vitals filed for this visit. PHYSICAL EXAMINATION:  Vital signs are as recorded. GENERAL:  alert and oriented, well nourished, well developed male in no acute distress at this time. Normal affect. HEENT:  TMs are clear. Nares are negative. Pharynx without erythema or exudate. Extraocular eye muscles are intact. Pupils are equal and reactive to light and accommodation. NECK:  exam showed no masses or adenopathy. Thyroid shows no enlargements or nodularities. HEART:  RRR without murmurs or gallops. Normal S2 split. LUNGS:  clear to auscultation, equal breath sounds bilaterally, no wheezing or rhonchi noted. ABDOMEN:  soft x 4, bowel sounds positive.   Negative

## 2018-08-01 NOTE — PROGRESS NOTES
Subjective:      Patient ID: Gris Pettit is a 20 y.o. female. Chief Complaint   Patient presents with    Weight Loss     Patient in office being seen for weight loss. States she is struggling to lose weight. She would like to discuss restarting medication     Fatigue       HPI  Here today follow-up on her polycystic ovary and fatigue are doing a little bit better she denies any other shortness of breath chest pain nausea vomiting on the metformin is helped quite a bit data x-ray did well for      Would like to know if she can go back on the medication as she had good improvement with her appetite also       No Known Allergies  Outpatient Encounter Prescriptions as of 8/1/2018   Medication Sig Dispense Refill    valACYclovir (VALTREX) 500 MG tablet TAKE 4 TABLETS BY MOUTH AT THE FIRST SIGN OF ATTACK  THEN TAKE 4 TABLETS 12 HOURS LATER 8 tablet 5    phentermine (ADIPEX-P) 37.5 MG tablet Take 1 tablet by mouth every morning (before breakfast) for 30 days. . 30 tablet 0    spironolactone (ALDACTONE) 25 MG tablet TAKE 2 TABLETS DAILY 180 tablet 3    metFORMIN (GLUCOPHAGE XR) 500 MG extended release tablet 2 po bid 120 tablet 3    [DISCONTINUED] valACYclovir (VALTREX) 500 MG tablet TAKE 4 TABLETS BY MOUTH AT THE FIRST SIGN OF ATTACK  THEN TAKE 4 TABLETS 12 HOURS LATER  6     Facility-Administered Encounter Medications as of 8/1/2018   Medication Dose Route Frequency Provider Last Rate Last Dose    etonogestrel (NEXPLANON) implant 68 mg  68 mg Subdermal Once Bruce Mcmillan MD   68 mg at 12/14/17 1049    Levonorgestrel [facility]) IUD 19.5 mg 1 each  1 each Intrauterine Once Boyd Vides MD   1 each at 04/07/17 0267     Social History     Social History    Marital status: Single     Spouse name: N/A    Number of children: N/A    Years of education: N/A     Occupational History    Not on file.      Social History Main Topics    Smoking status: Never Smoker    Smokeless tobacco: Never Used    Alcohol use Yes    Drug use: No    Sexual activity: Yes     Partners: Male     Other Topics Concern    Not on file     Social History Narrative    No narrative on file     Family History   Problem Relation Age of Onset    Other Father     Cancer Maternal Grandfather         esophageal    Breast Cancer Maternal Aunt      Past Medical History:   Diagnosis Date    Obesity     PCOS (polycystic ovarian syndrome)      No past surgical history on file. REVIEW OF SYSTEMS:   REVIEW OF SYSTEMS:   Patient seen today for exam.  Denies any problems with hearing, headaches or vision. Denies any shortness of breath, chest pain, nausea or vomiting. No black stool, no blood in the stool. No heartburn. Denies any problems with constipation or diarrhea either. No dysuria type symptoms. Objective:     /72   Pulse 80   Temp 97.7 °F (36.5 °C) (Temporal)   Resp 20   Ht 5' 2\" (1.575 m)   Wt 202 lb (91.6 kg)   Breastfeeding? No   BMI 36.95 kg/m²     Physical Exam      O:  Alert and active female in no acute distress  HEENT:  TMs clear. Pharynx neg. Nares clear, no drainage noted  Neck supple/ no adenopathy   HEART:  RRR without murmur/ no carotid bruits  LUNGS:  Clear to auscultation bilaterally, no wheeze or rhonchi noted  THYROID: neg masses or nodularity  ABDOMEN:  Soft x4. Bowel sounds positive. No masses or organomegaly,  Negative tenderness, guarding or rebound. EXTR:  Without edema./ good pulses bilat    Neurologic exam unremarkable. DTRs in upper and lower extremities within normal limits. Full strength noted    Skin- no lesions noted       Assessment:       Diagnosis Orders   1. PCOS (polycystic ovarian syndrome)     2. Weight loss     3.  BMI 36.0-36.9,adult  phentermine (ADIPEX-P) 37.5 MG tablet             Plan:        Orders Placed This Encounter   Medications    valACYclovir (VALTREX) 500 MG tablet     Sig: TAKE 4 TABLETS BY MOUTH AT THE FIRST SIGN OF ATTACK  THEN TAKE 4 TABLETS 12 HOURS

## 2018-08-08 ENCOUNTER — OFFICE VISIT (OUTPATIENT)
Dept: OBGYN CLINIC | Age: 23
End: 2018-08-08
Payer: COMMERCIAL

## 2018-08-08 VITALS
BODY MASS INDEX: 36.07 KG/M2 | WEIGHT: 196 LBS | SYSTOLIC BLOOD PRESSURE: 102 MMHG | HEIGHT: 62 IN | DIASTOLIC BLOOD PRESSURE: 80 MMHG

## 2018-08-08 DIAGNOSIS — Z01.419 WELL WOMAN EXAM WITH ROUTINE GYNECOLOGICAL EXAM: Primary | ICD-10-CM

## 2018-08-08 DIAGNOSIS — Z11.51 SCREENING FOR HPV (HUMAN PAPILLOMAVIRUS): ICD-10-CM

## 2018-08-08 DIAGNOSIS — Z01.419 PAP SMEAR, AS PART OF ROUTINE GYNECOLOGICAL EXAMINATION: ICD-10-CM

## 2018-08-08 PROCEDURE — 99395 PREV VISIT EST AGE 18-39: CPT | Performed by: NURSE PRACTITIONER

## 2018-08-08 NOTE — PROGRESS NOTES
SUBJECTIVE:   20 y.o. Luca Nicolasant female here for well woman exam. Pt without regular menses and no complaints today. Pt offered, declined STD screening. Pt with Nexplanon for contraception. Pt reports regular exercise and takes a multivitamin. Pt denies smoking and drinks ETOH occasionally. Review of Systems:  General ROS: negative  Psychological ROS: negative  EENT ROS: negative  Endocrine ROS: negative  Respiratory ROS: no cough, shortness of breath, or wheezing  Cardiovascular ROS: no chest pain or dyspnea on exertion  Gastrointestinal ROS: no abdominal pain, change in bowel habits, or black or bloody stools  Genito-Urinary ROS: no dysuria, trouble voiding, or hematuria  Musculoskeletal ROS: negative  Neurological ROS: no TIA or stroke symptoms  Dermatological ROS: negative    OBJECTIVE: Please see chart for additional documentation  /80   Ht 5' 2\" (1.575 m)   Wt 196 lb (88.9 kg)   Breastfeeding? No   BMI 35.85 kg/m²     Pt's medical and surgical history reviewed    Physical Exam:  CONSTITUTIONAL: She appears well and afebrile, moderately obese    NEUROLOGIC: Alert and oriented to person, place and time  NECK: no thyroidmegaly  BREASTS: Bilateral breasts without masses, skin changes or nipple discharge   LUNGS: Clear to ascultation bilaterally  CVS: regular rate and rhythm  LYMPHATIC: No palpable lymph nodes  ABDOMEN: benign, soft, nontender, no masses, bowel sounds active all four quadrants. No liver or splenic organomegaly. SKIN: warm and dry, normal texture and tone, no lesions    Pelvic Exam:   EFG: normal external genitalia  URETHRA: normal appearing without diverticula or lesions  VULVA: normal appearing vulva with no masses, tenderness or lesions  VAGINA: normal rugae, yellow mucus discharge   CERVIX: nulliparous, no lesions  UTERUS: difficult to assess due to body habitus, nontender. ADNEXA: difficult to assess due to body habitus, nontender.   PERINEUM: normal appearing without lesions or

## 2018-08-08 NOTE — PROGRESS NOTES
The patient was asked if she would like a chaperone present for her intimate exam. She  Declined the chaperone.  Lavern Shepard

## 2018-08-15 DIAGNOSIS — Z11.51 SCREENING FOR HPV (HUMAN PAPILLOMAVIRUS): ICD-10-CM

## 2018-08-15 DIAGNOSIS — Z01.419 PAP SMEAR, AS PART OF ROUTINE GYNECOLOGICAL EXAMINATION: ICD-10-CM

## 2018-08-27 RX ORDER — METFORMIN HYDROCHLORIDE 500 MG/1
TABLET, EXTENDED RELEASE ORAL
Qty: 180 TABLET | Refills: 3 | Status: SHIPPED | OUTPATIENT
Start: 2018-08-27 | End: 2018-08-31 | Stop reason: SDUPTHER

## 2018-08-31 ENCOUNTER — OFFICE VISIT (OUTPATIENT)
Dept: FAMILY MEDICINE CLINIC | Age: 23
End: 2018-08-31
Payer: COMMERCIAL

## 2018-08-31 VITALS
TEMPERATURE: 96.4 F | BODY MASS INDEX: 36.32 KG/M2 | DIASTOLIC BLOOD PRESSURE: 80 MMHG | WEIGHT: 197.38 LBS | HEIGHT: 62 IN | HEART RATE: 120 BPM | SYSTOLIC BLOOD PRESSURE: 110 MMHG | RESPIRATION RATE: 12 BRPM

## 2018-08-31 DIAGNOSIS — E28.2 PCOS (POLYCYSTIC OVARIAN SYNDROME): Primary | ICD-10-CM

## 2018-08-31 PROCEDURE — 99213 OFFICE O/P EST LOW 20 MIN: CPT | Performed by: FAMILY MEDICINE

## 2018-08-31 RX ORDER — VALACYCLOVIR HYDROCHLORIDE 500 MG/1
TABLET, FILM COATED ORAL
Qty: 8 TABLET | Refills: 5 | Status: SHIPPED | OUTPATIENT
Start: 2018-08-31

## 2018-08-31 RX ORDER — PHENTERMINE HYDROCHLORIDE 37.5 MG/1
37.5 TABLET ORAL
Qty: 30 TABLET | Refills: 0 | Status: SHIPPED | OUTPATIENT
Start: 2018-08-31 | End: 2018-09-26 | Stop reason: SDUPTHER

## 2018-08-31 RX ORDER — METFORMIN HYDROCHLORIDE 500 MG/1
TABLET, EXTENDED RELEASE ORAL
Qty: 180 TABLET | Refills: 3 | Status: SHIPPED | OUTPATIENT
Start: 2018-08-31 | End: 2018-09-05 | Stop reason: SDUPTHER

## 2018-08-31 RX ORDER — SPIRONOLACTONE 25 MG/1
TABLET ORAL
Qty: 180 TABLET | Refills: 3 | Status: SHIPPED | OUTPATIENT
Start: 2018-08-31 | End: 2018-09-05 | Stop reason: SDUPTHER

## 2018-09-05 DIAGNOSIS — E28.2 PCOS (POLYCYSTIC OVARIAN SYNDROME): ICD-10-CM

## 2018-09-05 RX ORDER — METFORMIN HYDROCHLORIDE 500 MG/1
TABLET, EXTENDED RELEASE ORAL
Qty: 180 TABLET | Refills: 3 | Status: SHIPPED | OUTPATIENT
Start: 2018-09-05 | End: 2018-09-10 | Stop reason: SDUPTHER

## 2018-09-05 RX ORDER — SPIRONOLACTONE 25 MG/1
TABLET ORAL
Qty: 180 TABLET | Refills: 3 | Status: SHIPPED | OUTPATIENT
Start: 2018-09-05 | End: 2018-09-10 | Stop reason: SDUPTHER

## 2018-09-05 NOTE — TELEPHONE ENCOUNTER
Medication: metformin and spironolactone    Last Office Visit: 8-31-18    Last Labs: 4-13-18    Last Filled: 8-31-18    Original prescriptions was sent to the wrong pharmacy. Correct pharmacy is in.     Please advise

## 2018-09-10 DIAGNOSIS — E28.2 PCOS (POLYCYSTIC OVARIAN SYNDROME): ICD-10-CM

## 2018-09-10 RX ORDER — METFORMIN HYDROCHLORIDE 500 MG/1
TABLET, EXTENDED RELEASE ORAL
Qty: 180 TABLET | Refills: 3 | Status: SHIPPED | OUTPATIENT
Start: 2018-09-10

## 2018-09-10 RX ORDER — SPIRONOLACTONE 25 MG/1
TABLET ORAL
Qty: 180 TABLET | Refills: 3 | Status: SHIPPED | OUTPATIENT
Start: 2018-09-10

## 2018-09-26 ENCOUNTER — OFFICE VISIT (OUTPATIENT)
Dept: FAMILY MEDICINE CLINIC | Age: 23
End: 2018-09-26
Payer: COMMERCIAL

## 2018-09-26 VITALS
RESPIRATION RATE: 16 BRPM | HEART RATE: 76 BPM | SYSTOLIC BLOOD PRESSURE: 118 MMHG | BODY MASS INDEX: 35.59 KG/M2 | HEIGHT: 62 IN | TEMPERATURE: 97.2 F | WEIGHT: 193.4 LBS | DIASTOLIC BLOOD PRESSURE: 84 MMHG

## 2018-09-26 DIAGNOSIS — E66.9 CLASS 2 OBESITY WITH BODY MASS INDEX (BMI) OF 36.0 TO 36.9 IN ADULT, UNSPECIFIED OBESITY TYPE, UNSPECIFIED WHETHER SERIOUS COMORBIDITY PRESENT: ICD-10-CM

## 2018-09-26 DIAGNOSIS — J02.9 SORE THROAT: Primary | ICD-10-CM

## 2018-09-26 DIAGNOSIS — E28.2 PCOS (POLYCYSTIC OVARIAN SYNDROME): ICD-10-CM

## 2018-09-26 PROCEDURE — 99213 OFFICE O/P EST LOW 20 MIN: CPT | Performed by: FAMILY MEDICINE

## 2018-09-26 RX ORDER — PHENTERMINE HYDROCHLORIDE 37.5 MG/1
37.5 TABLET ORAL
Qty: 30 TABLET | Refills: 0 | Status: SHIPPED | OUTPATIENT
Start: 2018-09-26 | End: 2018-10-26

## 2018-09-26 NOTE — PROGRESS NOTES
Subjective:      Patient ID: Radha Solo is a 20 y.o. female. Chief Complaint   Patient presents with    Obesity     Patient presents today for a follow-up on Obesity. Is taking Adipex 37.5 MG. States she has no concerns with this medication. This will be her 3rd RX. Has lost 4 pounds since her last visit.  Pharyngitis       HPI    Here today for sore throat congestion off last couple days no recent strep wanting a check      Doing pretty well taking her Adipex and continue to lose weight and exercise      She is watching her diet also which is been a great help      Nausea or shortness breath chest pain nausea vomiting    Wt Readings from Last 3 Encounters:   09/26/18 193 lb 6.4 oz (87.7 kg)   08/31/18 197 lb 6 oz (89.5 kg)   08/08/18 196 lb (88.9 kg)       No Known Allergies  Outpatient Encounter Prescriptions as of 9/26/2018   Medication Sig Dispense Refill    phentermine (ADIPEX-P) 37.5 MG tablet Take 1 tablet by mouth every morning (before breakfast) for 30 days. . 30 tablet 0    metFORMIN (GLUCOPHAGE-XR) 500 MG extended release tablet TAKE 1 TABLET TWICE A DAY WITH MEALS 180 tablet 3    spironolactone (ALDACTONE) 25 MG tablet TAKE 2 TABLETS DAILY 180 tablet 3    valACYclovir (VALTREX) 500 MG tablet TAKE 4 TABLETS BY MOUTH AT THE FIRST SIGN OF ATTACK  THEN TAKE 4 TABLETS 12 HOURS LATER 8 tablet 5    [DISCONTINUED] phentermine (ADIPEX-P) 37.5 MG tablet Take 1 tablet by mouth every morning (before breakfast) for 30 days. . 30 tablet 0     Facility-Administered Encounter Medications as of 9/26/2018   Medication Dose Route Frequency Provider Last Rate Last Dose    etonogestrel (NEXPLANON) implant 68 mg  68 mg Subdermal Once Zack Jones MD   68 mg at 12/14/17 1049     Social History     Social History    Marital status: Single     Spouse name: N/A    Number of children: N/A    Years of education: N/A     Occupational History     [employer]     Social History Main Topics    Smoking status: Never Smoker    Smokeless tobacco: Never Used    Alcohol use Yes      Comment: Here and There    Drug use: No    Sexual activity: No     Other Topics Concern    Not on file     Social History Narrative    No narrative on file     Family History   Problem Relation Age of Onset    Other Father     Cancer Maternal Grandfather         esophageal    Breast Cancer Maternal Aunt      Past Medical History:   Diagnosis Date    Obesity     PCOS (polycystic ovarian syndrome)      No past surgical history on file. REVIEW OF SYSTEMS:   REVIEW OF SYSTEMS:   Patient seen today for exam.  Denies any problems with hearing, headaches or vision. Denies any shortness of breath, chest pain, nausea or vomiting. No black stool, no blood in the stool. No heartburn. Denies any problems with constipation or diarrhea either. No dysuria type symptoms. Objective:     /84   Pulse 76   Temp 97.2 °F (36.2 °C) (Temporal)   Resp 16   Ht 5' 2\" (1.575 m)   Wt 193 lb 6.4 oz (87.7 kg)   Breastfeeding? No   BMI 35.37 kg/m²     Physical Exam      O:  Alert and active female in no acute distress  HEENT:  TMs clear. Pharynx neg. Nares clear, no drainage noted  Neck supple/ no adenopathy   HEART:  RRR without murmur/ no carotid bruits  LUNGS:  Clear to auscultation bilaterally, no wheeze or rhonchi noted  THYROID: neg masses or nodularity  ABDOMEN:  Soft x4. Bowel sounds positive. No masses or organomegaly,  Negative tenderness, guarding or rebound. EXTR:  Without edema./ good pulses bilat    Neurologic exam unremarkable. DTRs in upper and lower extremities within normal limits. Full strength noted    Skin- no lesions noted       Assessment:       Diagnosis Orders   1. Sore throat     2. Class 2 obesity with body mass index (BMI) of 36.0 to 36.9 in adult, unspecified obesity type, unspecified whether serious comorbidity present  phentermine (ADIPEX-P) 37.5 MG tablet   3.  PCOS (polycystic ovarian syndrome) Plan:        Orders Placed This Encounter   Medications    phentermine (ADIPEX-P) 37.5 MG tablet     Sig: Take 1 tablet by mouth every morning (before breakfast) for 30 days. .     Dispense:  30 tablet     Refill:  0     BMI 35.37     No orders of the defined types were placed in this encounter. There are no preventive care reminders to display for this patient. her throat is viral in nature she we treated symptomatically refill her prescription if things worsen or change less know      Controlled Substances Monitoring:     No flowsheet data found.         If anything worsens or changes please call us at once , follow-up in the office as planned,

## 2019-03-07 ENCOUNTER — TELEPHONE (OUTPATIENT)
Dept: FAMILY MEDICINE CLINIC | Age: 24
End: 2019-03-07

## 2023-04-01 PROBLEM — J02.9 TONSILLOPHARYNGITIS: Status: ACTIVE | Noted: 2023-04-01

## 2023-04-01 PROBLEM — R63.4 WEIGHT LOSS: Status: ACTIVE | Noted: 2023-04-01

## 2023-04-01 PROBLEM — R13.10 PAINFUL SWALLOWING: Status: ACTIVE | Noted: 2023-04-01

## 2023-04-01 PROBLEM — J03.00 STREPTOCOCCAL TONSILLOPHARYNGITIS: Status: ACTIVE | Noted: 2023-04-01

## 2023-04-01 PROBLEM — E66.812 CLASS 2 OBESITY DUE TO EXCESS CALORIES WITHOUT SERIOUS COMORBIDITY WITH BODY MASS INDEX (BMI) OF 37.0 TO 37.9 IN ADULT: Status: ACTIVE | Noted: 2023-04-01

## 2023-04-01 PROBLEM — B00.9 HERPES INFECTION: Status: ACTIVE | Noted: 2023-04-01

## 2023-04-01 PROBLEM — J02.9 SORE THROAT: Status: ACTIVE | Noted: 2023-04-01

## 2023-04-01 PROBLEM — J02.9 SORETHROAT: Status: ACTIVE | Noted: 2023-04-01

## 2023-04-01 PROBLEM — E66.09 CLASS 2 OBESITY DUE TO EXCESS CALORIES WITHOUT SERIOUS COMORBIDITY WITH BODY MASS INDEX (BMI) OF 37.0 TO 37.9 IN ADULT: Status: ACTIVE | Noted: 2023-04-01

## 2023-04-01 RX ORDER — VALACYCLOVIR HYDROCHLORIDE 1 G/1
TABLET, FILM COATED ORAL
COMMUNITY
Start: 2021-11-19

## 2023-04-01 RX ORDER — POLYMYXIN B SULFATE AND TRIMETHOPRIM 1; 10000 MG/ML; [USP'U]/ML
1-2 SOLUTION OPHTHALMIC
COMMUNITY
End: 2024-01-05 | Stop reason: ALTCHOICE

## 2023-04-03 ENCOUNTER — OFFICE VISIT (OUTPATIENT)
Dept: PRIMARY CARE | Facility: CLINIC | Age: 28
End: 2023-04-03
Payer: COMMERCIAL

## 2023-04-03 VITALS
BODY MASS INDEX: 35.01 KG/M2 | RESPIRATION RATE: 18 BRPM | TEMPERATURE: 97.4 F | WEIGHT: 190.25 LBS | DIASTOLIC BLOOD PRESSURE: 70 MMHG | HEART RATE: 82 BPM | SYSTOLIC BLOOD PRESSURE: 120 MMHG | HEIGHT: 62 IN | OXYGEN SATURATION: 98 %

## 2023-04-03 DIAGNOSIS — E66.09 CLASS 1 OBESITY DUE TO EXCESS CALORIES WITHOUT SERIOUS COMORBIDITY WITH BODY MASS INDEX (BMI) OF 34.0 TO 34.9 IN ADULT: ICD-10-CM

## 2023-04-03 DIAGNOSIS — Z76.89 ENCOUNTER FOR WEIGHT MANAGEMENT: Primary | ICD-10-CM

## 2023-04-03 PROCEDURE — 3008F BODY MASS INDEX DOCD: CPT | Performed by: FAMILY MEDICINE

## 2023-04-03 PROCEDURE — 99213 OFFICE O/P EST LOW 20 MIN: CPT | Performed by: FAMILY MEDICINE

## 2023-04-03 PROCEDURE — 1036F TOBACCO NON-USER: CPT | Performed by: FAMILY MEDICINE

## 2023-04-03 RX ORDER — SEMAGLUTIDE 0.25 MG/.5ML
0.25 INJECTION, SOLUTION SUBCUTANEOUS
Qty: 2 ML | Refills: 1 | Status: SHIPPED
Start: 2023-04-03 | End: 2023-04-07

## 2023-04-03 ASSESSMENT — PATIENT HEALTH QUESTIONNAIRE - PHQ9
2. FEELING DOWN, DEPRESSED OR HOPELESS: NOT AT ALL
1. LITTLE INTEREST OR PLEASURE IN DOING THINGS: NOT AT ALL
SUM OF ALL RESPONSES TO PHQ9 QUESTIONS 1 AND 2: 0

## 2023-04-03 NOTE — PROGRESS NOTES
"Subjective   Patient ID: Latia Anthony is a 27 y.o. female who presents for Weight Management.    HPI    Patient presents to the office today to follow up on weight management  Currently taking Adipex/Phentermine - Started on 12/07/2022. Patient is here to follow up with her weight.   Admits that she is eating healthy and exercising.   Patients last fill of Adipex was 02/01/2023  Patients weight in Feb was 204 lbs   Patients weight today is 190 lbs 4 oz   Would like to talk about weight loss injection  Patient admits that she did hear from her mother that adipex rules have changed.   Would like to talk about getting another refill of this if she can.   She states she is stuck between 188-190.    Review of systems  ; Patient seen today for exam denies any problems with headaches or vision, denies any shortness of breath chest pain nausea or vomiting, no black stool no blood in the stool no heartburn type symptoms denies any problems with constipation or diarrhea, and no dysuria-type symptoms    The patient's allergies medications were reviewed with them today    The patient's social family and surgical history or also reviewed here today, along with her past medical history.     Objective     Alert and active in  no acute distress  HEENT TMs clear oropharynx negative nares clear no drainage noted neck supple  With no adenopathy   Heart regular rate and rhythm without murmur and no carotid bruits  Lungs- clear to auscultation bilaterally, no wheeze or rhonchi noted  Thyroid -negative masses or nodularity  Abdomen- soft times four quadrants , bowel sounds positive no masses or organomegaly, negative tenderness guarding or rebound  Neurological exam unremarkable- DTRs in upper and lower extremities within normal limits.   skin -no lesions noted      /70 (BP Location: Right arm, Patient Position: Sitting, BP Cuff Size: Adult)   Pulse 82   Temp 36.3 °C (97.4 °F) (Temporal)   Resp 18   Ht 1.575 m (5' 2\")   Wt " 86.3 kg (190 lb 4 oz)   SpO2 98%   BMI 34.80 kg/m²     No Known Allergies    Assessment/Plan   Problem List Items Addressed This Visit    None  Visit Diagnoses       Encounter for weight management    -  Primary    Relevant Medications    semaglutide, weight loss, (Wegovy) 0.25 mg/0.5 mL pen injector    BMI 34.0-34.9,adult        Relevant Medications    semaglutide, weight loss, (Wegovy) 0.25 mg/0.5 mL pen injector    Class 1 obesity due to excess calories without serious comorbidity with body mass index (BMI) of 34.0 to 34.9 in adult              Discussed patient's BMI and to institute calorie reduction and increase exercise to decrease risk of diabetes and heart disease in the future.    Discussed options regarding weight loss.  Demonstrated injections with her today.  Wegovy 0.25 mg prescribed today.  She is aware of benefits and side effects.  Also discussed Qsymia.    If anything worsens or changes please call us at once, follow up in the office as planned.    Scribe Attestation  By signing my name below, I, Gilda eRyes MA, Scribe   attest that this documentation has been prepared under the direction and in the presence of Errol Major DO.

## 2023-04-07 ENCOUNTER — TELEPHONE (OUTPATIENT)
Dept: PRIMARY CARE | Facility: CLINIC | Age: 28
End: 2023-04-07

## 2023-04-07 RX ORDER — PHENTERMINE HYDROCHLORIDE 37.5 MG/1
37.5 CAPSULE ORAL
Qty: 30 CAPSULE | Refills: 1 | Status: SHIPPED | OUTPATIENT
Start: 2023-04-07 | End: 2023-07-05 | Stop reason: SDUPTHER

## 2023-04-07 NOTE — TELEPHONE ENCOUNTER
Pt is calling because she saw you last on 4/3/23 and you were going to try her on the weight loss shot, Wegovy but states you told her that if it didn't go through insurance to call back and you would put her back on Adipex. She states that the shot isn't covered and she would like to be put back on the Adipex.  Please send to [facility]    Thanks    Pt's # [phone]

## 2023-05-31 ENCOUNTER — TELEPHONE (OUTPATIENT)
Dept: PRIMARY CARE | Facility: CLINIC | Age: 28
End: 2023-05-31

## 2023-05-31 ENCOUNTER — OFFICE VISIT (OUTPATIENT)
Dept: PRIMARY CARE | Facility: CLINIC | Age: 28
End: 2023-05-31
Payer: COMMERCIAL

## 2023-05-31 VITALS
SYSTOLIC BLOOD PRESSURE: 94 MMHG | OXYGEN SATURATION: 98 % | BODY MASS INDEX: 34.67 KG/M2 | DIASTOLIC BLOOD PRESSURE: 68 MMHG | HEART RATE: 97 BPM | TEMPERATURE: 97.8 F | HEIGHT: 62 IN | WEIGHT: 188.4 LBS | RESPIRATION RATE: 16 BRPM

## 2023-05-31 DIAGNOSIS — J01.00 ACUTE NON-RECURRENT MAXILLARY SINUSITIS: Primary | ICD-10-CM

## 2023-05-31 PROCEDURE — 3008F BODY MASS INDEX DOCD: CPT | Performed by: FAMILY MEDICINE

## 2023-05-31 PROCEDURE — 1036F TOBACCO NON-USER: CPT | Performed by: FAMILY MEDICINE

## 2023-05-31 PROCEDURE — 99213 OFFICE O/P EST LOW 20 MIN: CPT | Performed by: FAMILY MEDICINE

## 2023-05-31 RX ORDER — CEFDINIR 300 MG/1
600 CAPSULE ORAL DAILY
Qty: 20 CAPSULE | Refills: 0 | Status: SHIPPED | OUTPATIENT
Start: 2023-05-31 | End: 2023-06-10

## 2023-05-31 ASSESSMENT — PATIENT HEALTH QUESTIONNAIRE - PHQ9
1. LITTLE INTEREST OR PLEASURE IN DOING THINGS: NOT AT ALL
SUM OF ALL RESPONSES TO PHQ9 QUESTIONS 1 AND 2: 0
2. FEELING DOWN, DEPRESSED OR HOPELESS: NOT AT ALL

## 2023-05-31 NOTE — PROGRESS NOTES
"Subjective   Patient ID: Latia Anthony is a 27 y.o. female who presents for Fatigue and Nasal Congestion.  HPI  Pt is here for fatigue and stuffy nose x4 day  Pt is also having symptom dizziness, cough, headache  Pt states fall in shower this morning and did not injury herself.  No fevers or chills   Pt tested for covid twice a was negative  Pt is Ibuprofen and Night Quill minimal relief    No other concern    Review of systems  ; Patient seen today for exam denies any problems with headaches or vision, denies any shortness of breath chest pain nausea or vomiting, no black stool no blood in the stool no heartburn type symptoms denies any problems with constipation or diarrhea, and no dysuria-type symptoms    The patient's allergies medications were reviewed with them today    The patient's social family and surgical history or also reviewed here today, along with her past medical history.     Objective     Alert and active in  no acute distress  HEENT TMs clear oropharynx negative nares clear no drainage noted neck supple  With no adenopathy   Heart regular rate and rhythm without murmur and no carotid bruits  Lungs- clear to auscultation bilaterally, no wheeze or rhonchi noted  Thyroid -negative masses or nodularity  Abdomen- soft times four quadrants , bowel sounds positive no masses or organomegaly, negative tenderness guarding or rebound  Neurological exam unremarkable- DTRs in upper and lower extremities within normal limits.   skin -no lesions noted      BP 94/68 (BP Location: Right arm, Patient Position: Sitting, BP Cuff Size: Adult)   Pulse 97   Temp 36.6 °C (97.8 °F) (Temporal)   Resp 16   Ht 1.575 m (5' 2\")   Wt 85.5 kg (188 lb 6.4 oz)   SpO2 98%   BMI 34.46 kg/m²     Allergies   Allergen Reactions    Lavender (Lavandula Angustifolia) Rash       Assessment/Plan   Problem List Items Addressed This Visit    None  Visit Diagnoses       Acute non-recurrent maxillary sinusitis    -  Primary    Relevant " Medications    cefdinir (Omnicef) 300 mg capsule              If anything worsens or changes please call us at once, follow up in the office as planned,

## 2023-07-05 ENCOUNTER — OFFICE VISIT (OUTPATIENT)
Dept: PRIMARY CARE | Facility: CLINIC | Age: 28
End: 2023-07-05
Payer: COMMERCIAL

## 2023-07-05 VITALS
HEIGHT: 62 IN | TEMPERATURE: 97.6 F | SYSTOLIC BLOOD PRESSURE: 104 MMHG | BODY MASS INDEX: 33.64 KG/M2 | WEIGHT: 182.8 LBS | HEART RATE: 85 BPM | DIASTOLIC BLOOD PRESSURE: 72 MMHG | RESPIRATION RATE: 16 BRPM | OXYGEN SATURATION: 98 %

## 2023-07-05 DIAGNOSIS — Z76.89 ENCOUNTER FOR WEIGHT MANAGEMENT: ICD-10-CM

## 2023-07-05 DIAGNOSIS — E66.09 CLASS 1 OBESITY DUE TO EXCESS CALORIES WITHOUT SERIOUS COMORBIDITY WITH BODY MASS INDEX (BMI) OF 33.0 TO 33.9 IN ADULT: ICD-10-CM

## 2023-07-05 DIAGNOSIS — E78.00 ELEVATED CHOLESTEROL: ICD-10-CM

## 2023-07-05 DIAGNOSIS — R63.4 WEIGHT LOSS: ICD-10-CM

## 2023-07-05 DIAGNOSIS — B07.9 WART OF HAND: ICD-10-CM

## 2023-07-05 PROCEDURE — 99213 OFFICE O/P EST LOW 20 MIN: CPT | Performed by: FAMILY MEDICINE

## 2023-07-05 PROCEDURE — 3008F BODY MASS INDEX DOCD: CPT | Performed by: FAMILY MEDICINE

## 2023-07-05 PROCEDURE — 1036F TOBACCO NON-USER: CPT | Performed by: FAMILY MEDICINE

## 2023-07-05 RX ORDER — PHENTERMINE HYDROCHLORIDE 37.5 MG/1
37.5 CAPSULE ORAL
Qty: 30 CAPSULE | Refills: 2 | Status: SHIPPED | OUTPATIENT
Start: 2023-07-05 | End: 2024-01-05 | Stop reason: SDUPTHER

## 2023-07-05 NOTE — PROGRESS NOTES
Subjective   Patient ID: Latia Anthony is a 27 y.o. female who presents for Obesity.    HPI    Weight loss management Follow up   Taking Adipex 37.5 mg   Following up for month :  She has not been on Adipex for 2 months.   Her last prescription was in [place].  Has been eating a generally healthy diet  Exercises 7 x per week  What type of exercise walking daily, yard work.  She has been hiking also and start water aerobics today.  Patient last in office weight 190 lbs  Today's in office weight 182 lbs   Patient is - 8 lbs   Has previously taken Adipex   Co morbidities include- Major depression  Any side effects noted? none    Lesions  Located of ring finger left hand.  She is not sure if they are warts.  Denies them spreading.  They do not itch or hurt.  States occasionally they may feel like a splinter.  They come and go.      Review of systems; Patient seen today for exam denies any problems with headaches or vision, denies any shortness of breath chest pain nausea or vomiting, no black stool no blood in the stool no heartburn type symptoms denies any problems with constipation or diarrhea, and no dysuria-type symptoms    The patient's allergies medications were reviewed with them today    The patient's social family and surgical history or also reviewed here today, along with her past medical history.     Objective   Weight reduction 5% since starting the Adipex  Alert and active in  no acute distress  HEENT TMs clear oropharynx negative nares clear no drainage noted neck supple  With no adenopathy   Heart regular rate and rhythm without murmur and no carotid bruits  Lungs- clear to auscultation bilaterally, no wheeze or rhonchi noted  Thyroid -negative masses or nodularity  Abdomen- soft times four quadrants , bowel sounds positive no masses or organomegaly, negative tenderness guarding or rebound  Neurological exam unremarkable- DTRs in upper and lower extremities within normal limits.   skin -no lesions  "noted      /72 (BP Location: Right arm, Patient Position: Sitting, BP Cuff Size: Adult)   Pulse 85   Temp 36.4 °C (97.6 °F) (Temporal)   Resp 16   Ht 1.575 m (5' 2\")   Wt 82.9 kg (182 lb 12.8 oz)   SpO2 98%   BMI 33.43 kg/m²     Allergies   Allergen Reactions    Lavender (Lavandula Angustifolia) Rash       Assessment/Plan   Problem List Items Addressed This Visit       Weight loss    Relevant Orders    Thyroid Stimulating Hormone    Thyroxine, Free     Other Visit Diagnoses       Adult BMI 34.0-34.9 kg/sq m        Relevant Medications    phentermine 37.5 mg capsule    Wart of hand        Encounter for weight management        Elevated cholesterol        Relevant Orders    Lipid Panel    BMI 33.0-33.9,adult        Class 1 obesity due to excess calories without serious comorbidity with body mass index (BMI) of 33.0 to 33.9 in adult              Discussed patient's BMI and to institute calorie reduction and increase exercise to decrease risk of diabetes and heart disease in the future.    Patient will benefit from Phentermine Therapy, given the following:    Advised to take Adipex 37.5 mg in the morning 1-2 hours after breakfast  [place] reviewed today  CSA Adipex 1/4/23  Does the patient demonstrate dedication to weight loss treatment plan? yes    Benefits, risks, possible adverse effects to the medication discussed today    If anything worsens or changes please call us at once, follow up in the office as planned.    "

## 2023-12-29 NOTE — PROGRESS NOTES
"Subjective   Patient ID: Latia Anthony is a 28 y.o. female who presents for Annual Exam and Obesity.  HPI  Annual physical   Eats a generally healthy diet   Exercises   Denies any chest pain,SOB  No Abdominal pain   No black or bloody stools   Urination/BM normal   Last eye apt: pt does not recall   Last dental apt November 2023  Last Gyn apt October 17th, 2023  No new family h/o cancers or heart disease    Review of systems  ; Patient seen today for exam denies any problems with headaches or vision, denies any shortness of breath chest pain nausea or vomiting, no black stool no blood in the stool no heartburn type symptoms denies any problems with constipation or diarrhea, and no dysuria-type symptoms    The patient's allergies medications were reviewed with them today    The patient's social family and surgical history or also reviewed here today, along with her past medical history.     Objective     Alert and active in  no acute distress  HEENT TMs clear oropharynx negative nares clear no drainage noted neck supple  With no adenopathy   Heart regular rate and rhythm without murmur and no carotid bruits  Lungs- clear to auscultation bilaterally, no wheeze or rhonchi noted  Thyroid -negative masses or nodularity  Abdomen- soft times four quadrants , bowel sounds positive no masses or organomegaly, negative tenderness guarding or rebound  Neurological exam unremarkable- DTRs in upper and lower extremities within normal limits.   skin -no lesions noted      /76 (BP Location: Right arm, Patient Position: Sitting, BP Cuff Size: Adult)   Pulse 93   Temp 36.5 °C (97.7 °F) (Temporal)   Ht 1.575 m (5' 2\")   Wt 82.2 kg (181 lb 3.2 oz)   SpO2 100%   BMI 33.14 kg/m²     Allergies   Allergen Reactions    Lavender (Lavandula Angustifolia) Rash       Assessment/Plan   Problem List Items Addressed This Visit    None  Visit Diagnoses       Routine general medical examination at a health care facility        Relevant " Orders    CBC and Auto Differential    Comprehensive Metabolic Panel    Adult BMI 34.0-34.9 kg/sq m        Relevant Medications    phentermine 37.5 mg capsule    semaglutide, weight loss, (Wegovy) 0.5 mg/0.5 mL pen injector              If anything worsens or changes please call us at once, follow up in the office as planned,

## 2024-01-05 ENCOUNTER — OFFICE VISIT (OUTPATIENT)
Dept: PRIMARY CARE | Facility: CLINIC | Age: 29
End: 2024-01-05
Payer: COMMERCIAL

## 2024-01-05 VITALS
DIASTOLIC BLOOD PRESSURE: 76 MMHG | WEIGHT: 181.2 LBS | OXYGEN SATURATION: 100 % | SYSTOLIC BLOOD PRESSURE: 102 MMHG | HEIGHT: 62 IN | HEART RATE: 93 BPM | TEMPERATURE: 97.7 F | BODY MASS INDEX: 33.34 KG/M2

## 2024-01-05 DIAGNOSIS — Z00.00 ROUTINE GENERAL MEDICAL EXAMINATION AT A HEALTH CARE FACILITY: ICD-10-CM

## 2024-01-05 PROCEDURE — 3008F BODY MASS INDEX DOCD: CPT | Performed by: FAMILY MEDICINE

## 2024-01-05 PROCEDURE — 1036F TOBACCO NON-USER: CPT | Performed by: FAMILY MEDICINE

## 2024-01-05 PROCEDURE — 99395 PREV VISIT EST AGE 18-39: CPT | Performed by: FAMILY MEDICINE

## 2024-01-05 RX ORDER — PHENTERMINE HYDROCHLORIDE 37.5 MG/1
37.5 CAPSULE ORAL
Qty: 30 CAPSULE | Refills: 2 | Status: SHIPPED | OUTPATIENT
Start: 2024-01-05

## 2024-01-05 RX ORDER — ETONOGESTREL 68 MG/1
68 IMPLANT SUBCUTANEOUS
COMMUNITY

## 2024-01-05 RX ORDER — SEMAGLUTIDE 0.5 MG/.5ML
0.5 INJECTION, SOLUTION SUBCUTANEOUS
Qty: 2 ML | Refills: 0 | Status: SHIPPED | OUTPATIENT
Start: 2024-01-05 | End: 2024-01-27

## 2024-01-08 ENCOUNTER — TELEPHONE (OUTPATIENT)
Dept: PRIMARY CARE | Facility: CLINIC | Age: 29
End: 2024-01-08
Payer: COMMERCIAL

## 2024-01-08 NOTE — TELEPHONE ENCOUNTER
----- Message from Latia Anthony sent at 1/5/2024  5:46 PM EST -----  Regarding: Shot prescription   Contact: [phone]  Dionisio,     The shot was not covered under my insurance and they wanted to charge me $1500. Can you send a prescription for the zepbound shot instead? That one is approved for weight loss and is more likely to be covered under my insurance according to the pharmacist.

## 2024-01-08 NOTE — TELEPHONE ENCOUNTER
LMOM with information.    Errol Major, DO  You13 minutes ago (3:12 PM)       We sent in Zepp bound

## 2024-01-19 ENCOUNTER — TELEPHONE (OUTPATIENT)
Dept: PRIMARY CARE | Facility: CLINIC | Age: 29
End: 2024-01-19
Payer: COMMERCIAL

## 2024-01-19 NOTE — TELEPHONE ENCOUNTER
PATIENT CALLED IN AND SPOKE TO [facility] STATING THAT THE PHARMACY STATED THAT A PRIOR AUTHORIZATION WAS STILL NEEDED FOR THE ZEPBOUND. THAT AUTHORIZATION HAS ALREADY BEEN SUBMITTED ON 01/08/2024 AND WE RECEIVED A RESPONSE STATING THAT THE MEDICATION MAY BE EXCLUDED FROM HER PLAN AND THAT IT IS NOT A COVERED BENEFIT.    I CALLED AN LMOM TO LET PATIENT KNOW THAT A PRIOR AUTHORIZATION WAS COMPLETED AND TOLD HER THAT IT IS NOT COVERED BY HER PLAN. I DID INFORM HER TO CALL HER INSURANCE IF SHE HAS FURTHER QUESTIONS ABOUT THE COVERAGE FOR WEIGHT LOSS MEDICATION.

## 2024-03-16 ENCOUNTER — LAB (OUTPATIENT)
Dept: LAB | Facility: LAB | Age: 29
End: 2024-03-16
Payer: COMMERCIAL

## 2024-03-16 DIAGNOSIS — Z00.00 ROUTINE GENERAL MEDICAL EXAMINATION AT A HEALTH CARE FACILITY: ICD-10-CM

## 2024-03-16 LAB
ALBUMIN SERPL BCP-MCNC: 4.5 G/DL (ref 3.4–5)
ALP SERPL-CCNC: 53 U/L (ref 33–110)
ALT SERPL W P-5'-P-CCNC: 8 U/L (ref 7–45)
ANION GAP SERPL CALC-SCNC: 10 MMOL/L (ref 10–20)
AST SERPL W P-5'-P-CCNC: 13 U/L (ref 9–39)
BASOPHILS # BLD AUTO: 0.05 X10*3/UL (ref 0–0.1)
BASOPHILS NFR BLD AUTO: 0.7 %
BILIRUB SERPL-MCNC: 0.3 MG/DL (ref 0–1.2)
BUN SERPL-MCNC: 14 MG/DL (ref 6–23)
CALCIUM SERPL-MCNC: 9.5 MG/DL (ref 8.6–10.3)
CHLORIDE SERPL-SCNC: 106 MMOL/L (ref 98–107)
CO2 SERPL-SCNC: 27 MMOL/L (ref 21–32)
CREAT SERPL-MCNC: 0.87 MG/DL (ref 0.5–1.05)
EGFRCR SERPLBLD CKD-EPI 2021: >90 ML/MIN/1.73M*2
EOSINOPHIL # BLD AUTO: 0.13 X10*3/UL (ref 0–0.7)
EOSINOPHIL NFR BLD AUTO: 1.8 %
ERYTHROCYTE [DISTWIDTH] IN BLOOD BY AUTOMATED COUNT: 12.3 % (ref 11.5–14.5)
GLUCOSE SERPL-MCNC: 93 MG/DL (ref 74–99)
HCT VFR BLD AUTO: 45.4 % (ref 36–46)
HGB BLD-MCNC: 14.7 G/DL (ref 12–16)
IMM GRANULOCYTES # BLD AUTO: 0.01 X10*3/UL (ref 0–0.7)
IMM GRANULOCYTES NFR BLD AUTO: 0.1 % (ref 0–0.9)
LYMPHOCYTES # BLD AUTO: 2.72 X10*3/UL (ref 1.2–4.8)
LYMPHOCYTES NFR BLD AUTO: 38.3 %
MCH RBC QN AUTO: 31.1 PG (ref 26–34)
MCHC RBC AUTO-ENTMCNC: 32.4 G/DL (ref 32–36)
MCV RBC AUTO: 96 FL (ref 80–100)
MONOCYTES # BLD AUTO: 0.51 X10*3/UL (ref 0.1–1)
MONOCYTES NFR BLD AUTO: 7.2 %
NEUTROPHILS # BLD AUTO: 3.68 X10*3/UL (ref 1.2–7.7)
NEUTROPHILS NFR BLD AUTO: 51.9 %
NRBC BLD-RTO: 0 /100 WBCS (ref 0–0)
PLATELET # BLD AUTO: 334 X10*3/UL (ref 150–450)
POTASSIUM SERPL-SCNC: 4.4 MMOL/L (ref 3.5–5.3)
PROT SERPL-MCNC: 7.3 G/DL (ref 6.4–8.2)
RBC # BLD AUTO: 4.73 X10*6/UL (ref 4–5.2)
SODIUM SERPL-SCNC: 139 MMOL/L (ref 136–145)
WBC # BLD AUTO: 7.1 X10*3/UL (ref 4.4–11.3)

## 2024-03-16 PROCEDURE — 80053 COMPREHEN METABOLIC PANEL: CPT

## 2024-03-16 PROCEDURE — 85025 COMPLETE CBC W/AUTO DIFF WBC: CPT

## 2024-03-16 PROCEDURE — 36415 COLL VENOUS BLD VENIPUNCTURE: CPT

## 2024-03-19 ENCOUNTER — TELEPHONE (OUTPATIENT)
Dept: PRIMARY CARE | Facility: CLINIC | Age: 29
End: 2024-03-19
Payer: COMMERCIAL

## 2024-03-19 NOTE — TELEPHONE ENCOUNTER
----- Message from Errol Major DO sent at 3/18/2024 10:50 AM EDT -----  I reviewed the patient's labs,,, all all within normal range,, follow-up as planned

## 2024-03-27 ENCOUNTER — TELEPHONE (OUTPATIENT)
Dept: PRIMARY CARE | Facility: CLINIC | Age: 29
End: 2024-03-27
Payer: COMMERCIAL

## 2024-03-27 NOTE — TELEPHONE ENCOUNTER
----- Message from Latia Anthony sent at 3/26/2024  5:45 PM EDT -----  Regarding: Labs  Contact: [phone]  Please have Dr. Major to send the my prescription to [facility] in [place].  For the Wegovy..... I would like to switch to the shot in lieu of the adipex..... i checked and without insurance the shot is approx $275 for 4 shots. Thank you  Latia Anthony

## 2024-03-28 NOTE — TELEPHONE ENCOUNTER
Okay to send prescription to [facility] as we discussed 0.6 of semaglutide each week x 4 weeks       Medication request form faxed

## 2024-05-06 ENCOUNTER — TELEPHONE (OUTPATIENT)
Dept: PRIMARY CARE | Facility: CLINIC | Age: 29
End: 2024-05-06
Payer: COMMERCIAL

## 2024-05-06 NOTE — TELEPHONE ENCOUNTER
LMOM for patient to call back.    Errol Major, DO  You1 hour ago (11:47 AM)       Let her know we can just send a prescription for the next dose to that compound pharmacy,, we should at least put 2 refills on them every time its on the bottom of the sheet,,, we can go to the next dose if she is okay with that or stay in the dose she is at let me know

## 2024-05-06 NOTE — TELEPHONE ENCOUNTER
On 3-28-24 Semaglutide 0.6 MG was called into [facility]. Do you want to increase to 1.2 MG? If so, how many refills? Please advise.      ----- Message from Latia Anthony sent at 5/3/2024  7:38 PM EDT -----  Regarding: Semiglutide shot   Contact: [phone]  Dionisio,     The semiglutide shot seems to be working. I am losing about a pound and a half a week. I took the last one for a month prescription today though and was wondering if I have refills? Do I need to come in a see you before getting refills? Do I just need to call [facility] and let them know I need more? Please advise.       Thank you in advance,    Latia Anthony

## 2024-05-06 NOTE — TELEPHONE ENCOUNTER
Errol Major, DO  You8 minutes ago (11:47 AM)       Let her know we can just send a prescription for the next dose to that compound pharmacy,, we should at least put 2 refills on them every time its on the bottom of the sheet,,, we can go to the next dose if she is okay with that or stay in the dose she is at let me know

## 2024-07-19 NOTE — PROGRESS NOTES
Subjective   Patient ID: Latia Anthony is a 28 y.o. female who presents for No chief complaint on file..    HPI    Patient presents today for weight management. She is currently taking Semaglutide. Last office weight was 181.3. Today's office weight is ***. She has lost *** pounds. She is/not eating a healthy diet.  She is/not exercising.       Review of systems  ; Patient seen today for exam denies any problems with headaches or vision, denies any shortness of breath chest pain nausea or vomiting, no black stool no blood in the stool no heartburn type symptoms denies any problems with constipation or diarrhea, and no dysuria-type symptoms    The patient's allergies medications were reviewed with them today    The patient's social family and surgical history or also reviewed here today, along with her past medical history.     Objective     Alert and active in  no acute distress  HEENT TMs clear oropharynx negative nares clear no drainage noted neck supple  With no adenopathy   Heart regular rate and rhythm without murmur and no carotid bruits  Lungs- clear to auscultation bilaterally, no wheeze or rhonchi noted  Thyroid -negative masses or nodularity  Abdomen- soft times four quadrants , bowel sounds positive no masses or organomegaly, negative tenderness guarding or rebound  Neurological exam unremarkable- DTRs in upper and lower extremities within normal limits.   skin -no lesions noted      There were no vitals taken for this visit.    Allergies   Allergen Reactions    Lavender (Lavandula Angustifolia) Rash       Assessment/Plan   Problem List Items Addressed This Visit    None        If anything worsens or changes please call us at once, follow up in the office as planned,

## 2024-07-22 ENCOUNTER — APPOINTMENT (OUTPATIENT)
Dept: PRIMARY CARE | Facility: CLINIC | Age: 29
End: 2024-07-22
Payer: COMMERCIAL

## 2024-07-22 DIAGNOSIS — Z76.89 ENCOUNTER FOR WEIGHT MANAGEMENT: ICD-10-CM

## 2024-07-22 DIAGNOSIS — E78.00 ELEVATED CHOLESTEROL: ICD-10-CM

## 2024-07-23 ENCOUNTER — TELEPHONE (OUTPATIENT)
Dept: PRIMARY CARE | Facility: CLINIC | Age: 29
End: 2024-07-23
Payer: COMMERCIAL

## 2024-07-23 NOTE — TELEPHONE ENCOUNTER
Patient is calling because she was supposed to come in for an appointment yesterday for her weight management appointment for the Semaglutide but she had to cancel and reschedule to 7/29/24. Her dad passed away and she had to go the [facility]. She wanted to know if she could get another RX refill of the Semaglutide 1.2 mg/  0.33 ml faxed to [facility] in [place] before her appointment on 7/29/24 because she will be due for her shot.    Aware you are out of the office today    Please advise  Thanks    Patient's # [phone]

## 2024-07-24 NOTE — TELEPHONE ENCOUNTER
If she is doing well on this dose I think it is okay to stay on it 1.2, if she has had the 1.2 for a month and is not nauseous in any way we can increase her to the next dose of 1.8       Called patient, left message for return call.

## 2024-07-25 PROBLEM — Z76.89 ENCOUNTER FOR WEIGHT MANAGEMENT: Status: ACTIVE | Noted: 2024-07-25

## 2024-07-25 NOTE — TELEPHONE ENCOUNTER
Spoke with the patient, she is doing well. She states that she she has only lost 1 lb in the last month, so she would like to increase the dose.     Can we send this to [facility], thanks

## 2024-07-25 NOTE — PROGRESS NOTES
"Subjective   Patient ID: Latia Anthony is a 28 y.o. female who presents for Weight Loss, Fatigue, and Alopecia.    HPI    Patient presents today for weight management. She is currently taking Semaglutide 1.8 mg. Last office weight was 181.3. Today's office weight is 170. She has lost 11 pounds. She is eating a healthy diet.  She is exercising.     Pt want to discuss possible thyroid issues with weight loss pt feels that she not losing enough weight  Pt is losing hair and feeling fatigue x 1 1/2 month.  She has been more stressed.  She does sleep well.   Does not want to harm self or others.    No other concern /question       Review of systems  ; Patient seen today for exam denies any problems with headaches or vision, denies any shortness of breath chest pain nausea or vomiting, no black stool no blood in the stool no heartburn type symptoms denies any problems with constipation or diarrhea, and no dysuria-type symptoms    The patient's allergies medications were reviewed with them today    The patient's social family and surgical history or also reviewed here today, along with her past medical history.     Objective     Alert and active in  no acute distress  HEENT TMs clear oropharynx negative nares clear no drainage noted neck supple  With no adenopathy   Heart regular rate and rhythm without murmur and no carotid bruits  Lungs- clear to auscultation bilaterally, no wheeze or rhonchi noted  Thyroid -negative masses or nodularity  Abdomen- soft times four quadrants , bowel sounds positive no masses or organomegaly, negative tenderness guarding or rebound  Neurological exam unremarkable- DTRs in upper and lower extremities within normal limits.   skin - sun exposure lesions on forehead.      /72 (BP Location: Right arm, Patient Position: Sitting, BP Cuff Size: Adult)   Pulse 86   Temp 36.2 °C (97.2 °F) (Temporal)   Resp 16   Ht 1.575 m (5' 2\")   Wt 77.5 kg (170 lb 12.8 oz)   SpO2 97%   BMI 31.24 " kg/m²     Allergies   Allergen Reactions    Lavender (Lavandula Angustifolia) Rash       Assessment/Plan   Problem List Items Addressed This Visit       Weight loss - Primary    Encounter for weight management    BMI 31.0-31.9,adult     Other Visit Diagnoses       Class 1 obesity due to excess calories without serious comorbidity with body mass index (BMI) of 31.0 to 31.9 in adult        Malaise and fatigue        Relevant Orders    Thyroid Stimulating Hormone    Thyroxine, Free    Hair loss        Screening cholesterol level        Relevant Orders    Lipid Panel    Exposure to STD        Relevant Orders    Hepatitis C Antibody    HIV 1/2 Antigen/Antibody Screen with Reflex to Confirmation          Discussed patient's BMI and to institute calorie reduction and increase exercise to decrease risk of diabetes and heart disease in the future.    Should take a multivitamin daily.     Labs have been ordered, she/he will have these performed and we will contact her/him with results.  (TSH, T4, Lipid, HIV, Hepatitis C)    If anything worsens or changes please call us at once, follow up in the office as planned.    Scribe Attestation  By signing my name below, I, Gilda Reyes MA, Scribe   attest that this documentation has been prepared under the direction and in the presence of Errol Major DO.

## 2024-07-29 ENCOUNTER — APPOINTMENT (OUTPATIENT)
Dept: PRIMARY CARE | Facility: CLINIC | Age: 29
End: 2024-07-29
Payer: COMMERCIAL

## 2024-07-29 ENCOUNTER — LAB (OUTPATIENT)
Dept: LAB | Facility: LAB | Age: 29
End: 2024-07-29
Payer: COMMERCIAL

## 2024-07-29 VITALS
DIASTOLIC BLOOD PRESSURE: 72 MMHG | HEART RATE: 86 BPM | OXYGEN SATURATION: 97 % | RESPIRATION RATE: 16 BRPM | SYSTOLIC BLOOD PRESSURE: 106 MMHG | TEMPERATURE: 97.2 F | BODY MASS INDEX: 31.43 KG/M2 | HEIGHT: 62 IN | WEIGHT: 170.8 LBS

## 2024-07-29 DIAGNOSIS — Z76.89 ENCOUNTER FOR WEIGHT MANAGEMENT: ICD-10-CM

## 2024-07-29 DIAGNOSIS — R53.81 MALAISE AND FATIGUE: ICD-10-CM

## 2024-07-29 DIAGNOSIS — R53.83 MALAISE AND FATIGUE: ICD-10-CM

## 2024-07-29 DIAGNOSIS — E66.09 CLASS 1 OBESITY DUE TO EXCESS CALORIES WITHOUT SERIOUS COMORBIDITY WITH BODY MASS INDEX (BMI) OF 31.0 TO 31.9 IN ADULT: ICD-10-CM

## 2024-07-29 DIAGNOSIS — Z20.2 EXPOSURE TO STD: ICD-10-CM

## 2024-07-29 DIAGNOSIS — R63.4 WEIGHT LOSS: Primary | ICD-10-CM

## 2024-07-29 DIAGNOSIS — L65.9 HAIR LOSS: ICD-10-CM

## 2024-07-29 DIAGNOSIS — Z13.220 SCREENING CHOLESTEROL LEVEL: ICD-10-CM

## 2024-07-29 LAB
CHOLEST SERPL-MCNC: 149 MG/DL (ref 0–199)
CHOLESTEROL/HDL RATIO: 3.9
HDLC SERPL-MCNC: 38.3 MG/DL
LDLC SERPL CALC-MCNC: 99 MG/DL
NON HDL CHOLESTEROL: 111 MG/DL (ref 0–149)
T4 FREE SERPL-MCNC: 0.8 NG/DL (ref 0.61–1.12)
TRIGL SERPL-MCNC: 57 MG/DL (ref 0–149)
TSH SERPL-ACNC: 1.54 MIU/L (ref 0.44–3.98)
VLDL: 11 MG/DL (ref 0–40)

## 2024-07-29 PROCEDURE — 36415 COLL VENOUS BLD VENIPUNCTURE: CPT

## 2024-07-29 PROCEDURE — 84439 ASSAY OF FREE THYROXINE: CPT

## 2024-07-29 PROCEDURE — 87389 HIV-1 AG W/HIV-1&-2 AB AG IA: CPT

## 2024-07-29 PROCEDURE — 1036F TOBACCO NON-USER: CPT | Performed by: FAMILY MEDICINE

## 2024-07-29 PROCEDURE — 86803 HEPATITIS C AB TEST: CPT

## 2024-07-29 PROCEDURE — 99213 OFFICE O/P EST LOW 20 MIN: CPT | Performed by: FAMILY MEDICINE

## 2024-07-29 PROCEDURE — 84443 ASSAY THYROID STIM HORMONE: CPT

## 2024-07-29 PROCEDURE — 3008F BODY MASS INDEX DOCD: CPT | Performed by: FAMILY MEDICINE

## 2024-07-29 PROCEDURE — 80061 LIPID PANEL: CPT

## 2024-07-30 ENCOUNTER — TELEPHONE (OUTPATIENT)
Dept: PRIMARY CARE | Facility: CLINIC | Age: 29
End: 2024-07-30
Payer: COMMERCIAL

## 2024-07-30 LAB
HCV AB SER QL: NONREACTIVE
HIV 1+2 AB+HIV1 P24 AG SERPL QL IA: NONREACTIVE

## 2024-07-30 NOTE — TELEPHONE ENCOUNTER
----- Message from Errol Major sent at 7/30/2024  7:52 AM EDT -----  Labs all look okay, all studies are negative cholesterol the best it has been with her weight loss, her thyroid studies are normal at this time, I would take a multivitamin every day and recheck things in 6 months

## 2024-11-04 ENCOUNTER — TELEPHONE (OUTPATIENT)
Dept: PRIMARY CARE | Facility: CLINIC | Age: 29
End: 2024-11-04
Payer: COMMERCIAL

## 2024-11-04 NOTE — TELEPHONE ENCOUNTER
Okay to increase to the maximum dose       Prescription placed on Dr. Mata's desk for signature

## 2024-11-04 NOTE — TELEPHONE ENCOUNTER
Hello,      I am out of refills of semiglutide to [facility]. I was wondering one: can I go up to the next dose? And two: can you call it in for me to go ?            Thanks in advance,     Latia Anthony

## 2024-11-21 ENCOUNTER — OFFICE VISIT (OUTPATIENT)
Facility: CLINIC | Age: 29
End: 2024-11-21
Payer: COMMERCIAL

## 2024-11-21 VITALS
HEIGHT: 62 IN | BODY MASS INDEX: 30 KG/M2 | SYSTOLIC BLOOD PRESSURE: 109 MMHG | DIASTOLIC BLOOD PRESSURE: 72 MMHG | WEIGHT: 163 LBS

## 2024-11-21 DIAGNOSIS — Z30.46 ENCOUNTER FOR NEXPLANON REMOVAL: Primary | ICD-10-CM

## 2024-11-21 DIAGNOSIS — Z30.017 ENCOUNTER FOR INITIAL PRESCRIPTION OF IMPLANTABLE SUBDERMAL CONTRACEPTIVE: ICD-10-CM

## 2024-11-21 LAB — PREGNANCY TEST URINE, POC: NEGATIVE

## 2024-11-21 PROCEDURE — 99203 OFFICE O/P NEW LOW 30 MIN: CPT

## 2024-11-21 PROCEDURE — 81025 URINE PREGNANCY TEST: CPT

## 2024-11-21 PROCEDURE — 1036F TOBACCO NON-USER: CPT

## 2024-11-21 PROCEDURE — 96372 THER/PROPH/DIAG INJ SC/IM: CPT

## 2024-11-21 PROCEDURE — 3008F BODY MASS INDEX DOCD: CPT

## 2024-11-21 PROCEDURE — 11983 REMOVE/INSERT DRUG IMPLANT: CPT

## 2024-11-21 RX ORDER — LIDOCAINE HYDROCHLORIDE 10 MG/ML
2 INJECTION, SOLUTION EPIDURAL; INFILTRATION; INTRACAUDAL; PERINEURAL ONCE
Status: COMPLETED | OUTPATIENT
Start: 2024-11-21 | End: 2024-11-21

## 2024-11-21 ASSESSMENT — LIFESTYLE VARIABLES
HOW OFTEN DO YOU HAVE SIX OR MORE DRINKS ON ONE OCCASION: NEVER
HOW MANY STANDARD DRINKS CONTAINING ALCOHOL DO YOU HAVE ON A TYPICAL DAY: 1 OR 2
HOW OFTEN DO YOU HAVE A DRINK CONTAINING ALCOHOL: MONTHLY OR LESS
AUDIT-C TOTAL SCORE: 1
SKIP TO QUESTIONS 9-10: 1

## 2024-11-21 NOTE — PROGRESS NOTES
Assessment/Plan      Subdermal Contraceptive Implant Removal Procedure:    Implant identified with palpation. Left upper arm prepped with Betadinex3. 1% lidocaine injected at planned incision site. A 3 mm incision was made in the longitudinal direction of the arm at the tip of the implant closest to the elbow. The implant was removed using a hemostat. The implant was inspected and found to be intact and complete.      Difficulties with the implant removal procedure?  no    The new contraceptive desiree was then inserted at the same incision site, according to the 's instructions without complications. The desiree was palpable under the skin after the insertion. The insertion site was closed with Steri-strip and a pressure dressing was applied.     Successful insertion of nexplanon device.    Latia was given post-insertion instructions. She understands that the implant must be removed at the end of 3 years and may be removed sooner if she wishes.    - instructed to leave kerlex bandage on x24hrs, no showering x24hrs  - instructed to leave steri-strips on until they fall off on their own, or for 7 days  - reviewed s/s of infection for which to call    RTO as needed    NIKO Reyes    Subjective     Latia Anthony desires a subdermal etonogestrel contraceptive implant removal and reinsertion.   Date of Insertion: 3 years ago, per patient   Reason(s) for removal: Product life completed  She has been counseled regarding the risks, benefits and alternatives to the implant. She especially understands that her menstrual periods are expected to become irregular and unpredictable throughout the time she is using the implant. She has no contraindications to the reinsertion. Her questions have been answered. She has fully reviewed the FDA-approved consent brochure, has signed the consent form, and wishes to proceed with the removal and reinsertion procedures today.     Objective     /72 (BP Location:  "Right arm, Patient Position: Sitting)   Ht 1.575 m (5' 2\")   Wt 73.9 kg (163 lb)   BMI 29.81 kg/m²     Physical Exam  Vitals reviewed.   Constitutional:       General: She is not in acute distress.     Appearance: Normal appearance.   HENT:      Head: Normocephalic and atraumatic.   Pulmonary:      Effort: Pulmonary effort is normal.   Musculoskeletal:         General: Normal range of motion.      Cervical back: Normal range of motion.   Neurological:      Mental Status: She is alert and oriented to person, place, and time.   Psychiatric:         Mood and Affect: Mood normal.         Behavior: Behavior normal.         Thought Content: Thought content normal.         Judgment: Judgment normal.        "

## 2024-12-02 ENCOUNTER — APPOINTMENT (OUTPATIENT)
Dept: OBSTETRICS AND GYNECOLOGY | Facility: CLINIC | Age: 29
End: 2024-12-02
Payer: COMMERCIAL

## 2025-01-27 ENCOUNTER — APPOINTMENT (OUTPATIENT)
Dept: PRIMARY CARE | Facility: CLINIC | Age: 30
End: 2025-01-27
Payer: COMMERCIAL

## 2025-01-27 VITALS
TEMPERATURE: 97.5 F | RESPIRATION RATE: 16 BRPM | SYSTOLIC BLOOD PRESSURE: 94 MMHG | HEIGHT: 62 IN | HEART RATE: 92 BPM | BODY MASS INDEX: 31.36 KG/M2 | DIASTOLIC BLOOD PRESSURE: 66 MMHG | WEIGHT: 170.4 LBS | OXYGEN SATURATION: 99 %

## 2025-01-27 DIAGNOSIS — L72.3 SEBACEOUS CYST: Primary | ICD-10-CM

## 2025-01-27 DIAGNOSIS — B00.1 COLD SORE: ICD-10-CM

## 2025-01-27 DIAGNOSIS — E66.811 CLASS 1 OBESITY DUE TO EXCESS CALORIES WITHOUT SERIOUS COMORBIDITY WITH BODY MASS INDEX (BMI) OF 31.0 TO 31.9 IN ADULT: ICD-10-CM

## 2025-01-27 DIAGNOSIS — E66.09 CLASS 1 OBESITY DUE TO EXCESS CALORIES WITHOUT SERIOUS COMORBIDITY WITH BODY MASS INDEX (BMI) OF 31.0 TO 31.9 IN ADULT: ICD-10-CM

## 2025-01-27 PROCEDURE — 99213 OFFICE O/P EST LOW 20 MIN: CPT | Performed by: FAMILY MEDICINE

## 2025-01-27 PROCEDURE — 3008F BODY MASS INDEX DOCD: CPT | Performed by: FAMILY MEDICINE

## 2025-01-27 PROCEDURE — 1036F TOBACCO NON-USER: CPT | Performed by: FAMILY MEDICINE

## 2025-01-27 RX ORDER — AMOXICILLIN AND CLAVULANATE POTASSIUM 875; 125 MG/1; MG/1
875 TABLET, FILM COATED ORAL 2 TIMES DAILY
Qty: 20 TABLET | Refills: 0 | Status: SHIPPED | OUTPATIENT
Start: 2025-01-27 | End: 2025-02-06

## 2025-01-27 RX ORDER — VALACYCLOVIR HYDROCHLORIDE 500 MG/1
500 TABLET, FILM COATED ORAL 2 TIMES DAILY
Qty: 20 TABLET | Refills: 3 | Status: SHIPPED | OUTPATIENT
Start: 2025-01-27 | End: 2025-02-06

## 2025-01-27 ASSESSMENT — PATIENT HEALTH QUESTIONNAIRE - PHQ9
SUM OF ALL RESPONSES TO PHQ9 QUESTIONS 1 AND 2: 0
2. FEELING DOWN, DEPRESSED OR HOPELESS: NOT AT ALL
1. LITTLE INTEREST OR PLEASURE IN DOING THINGS: NOT AT ALL

## 2025-01-27 NOTE — PROGRESS NOTES
"Subjective   Patient ID: Latia Anthony is a 29 y.o. female who presents for Cyst and Weight Loss.  HPI  pt is being seen for painful left shoulder cyst.  ongoing for 2 weeks ago.  Pt states that the is cysts is tender and getting bigger.  No discharge or drainage.  Pt has tried to keep the area clean.     Pt needs the Semaglutide for weight loss.  Pt was on Semaglutide 2.5 mg but has been off it for a month now.  She does feel full with the medication and doesn't overeat.  She lost 50 lbs with the medication.  She also got back to gym.    Pt taking Valtrex for cold sore.      No other concern / question     Review of systems  ; Patient seen today for exam denies any problems with headaches or vision, denies any shortness of breath chest pain nausea or vomiting, no black stool no blood in the stool no heartburn type symptoms denies any problems with constipation or diarrhea, and no dysuria-type symptoms    The patient's allergies medications were reviewed with them today    The patient's social family and surgical history or also reviewed here today, along with her past medical history.     Objective     Alert and active in  no acute distress  HEENT TMs clear oropharynx negative nares clear no drainage noted neck supple  With no adenopathy   Heart regular rate and rhythm without murmur and no carotid bruits  Lungs- clear to auscultation bilaterally, no wheeze or rhonchi noted  Thyroid -negative masses or nodularity  Abdomen- soft times four quadrants , bowel sounds positive no masses or organomegaly, negative tenderness guarding or rebound  Neurological exam unremarkable- DTRs in upper and lower extremities within normal limits.   skin -left upper shoulder consistent with sebaceous cyst recurrent slight erythema and tender to touch      BP 94/66 (BP Location: Right arm, Patient Position: Sitting, BP Cuff Size: Adult)   Pulse 92   Temp 36.4 °C (97.5 °F) (Temporal)   Resp 16   Ht 1.575 m (5' 2\")   Wt 77.3 kg (170 " lb 6.4 oz)   SpO2 99%   BMI 31.17 kg/m²     Allergies   Allergen Reactions    Lavender (Lavandula Angustifolia) Rash       Assessment/Plan   Problem List Items Addressed This Visit       Class 1 obesity due to excess calories without serious comorbidity with body mass index (BMI) of 31.0 to 31.9 in adult    BMI 31.0-31.9,adult    Cold sore    Relevant Medications    valACYclovir (Valtrex) 500 mg tablet     Other Visit Diagnoses       Sebaceous cyst    -  Primary    Relevant Medications    amoxicillin-pot clavulanate (Augmentin) 875-125 mg tablet    Other Relevant Orders    Referral to General Surgery    Adult BMI 34.0-34.9 kg/sq m                Augmentin prescribed today.   Referral provided to general surgery for evaluation of the cyst and possible removal.     Refilled Valacyclovir.   Take multivitamins supplement and high-protein diet.     Recommended to stop eating when feels full with Semaglutide.    If anything worsens or changes please call us at once, follow up in the office as planned,       Scribe Attestation  By signing my name below, ILiliya, Spenser   attest that this documentation has been prepared under the direction and in the presence of Errol Major DO.

## 2025-05-15 NOTE — PROGRESS NOTES
"Subjective   Patient ID: Latia Anthony is a 29 y.o. female who presents for Weight Management.  And anxiety//  HPI    Patient presents in office today to talk about weight loss medication changes. Patient admits that she was taking adipex in the past. States that she switched to semaglutide but would like to go back to adipex. She is at the highest dose of semaglutide 2.2 mg but states it is not helping at all. Patient is working out daily. States that she is walking, riding bikes, walking stairs but unable to go to the gym due to being busy with the work. She is staying away from sugar. She only eats lunch and dinner and skips breakfast.    Anxiety and stress has been doing okay also stay off medications but the year death of her father approaching    Frustrated semaglutide was helping out as not but she is not exercising that really following a strict diet which we recommend she get back on      Review of systems  ; Patient seen today for exam denies any problems with headaches or vision, denies any shortness of breath chest pain nausea or vomiting, no black stool no blood in the stool no heartburn type symptoms denies any problems with constipation or diarrhea, and no dysuria-type symptoms    The patient's allergies medications were reviewed with them today    The patient's social family and surgical history or also reviewed here today, along with her past medical history.     Objective     Alert and active in  no acute distress  HEENT TMs clear oropharynx negative nares clear no drainage noted neck supple  With no adenopathy   Heart regular rate and rhythm without murmur and no carotid bruits  Lungs- clear to auscultation bilaterally, no wheeze or rhonchi noted  Thyroid -negative masses or nodularity  Abdomen- soft times four quadrants , bowel sounds positive no masses or organomegaly, negative tenderness guarding or rebound    skin -no lesions noted      /70   Resp 18   Ht 1.575 m (5' 2\")   Wt 78.9 kg " (174 lb)   BMI 31.83 kg/m²     Allergies[1]    Assessment/Plan   Problem List Items Addressed This Visit       Class 1 obesity due to excess calories without serious comorbidity with body mass index (BMI) of 31.0 to 31.9 in adult    BMI 31.0-31.9,adult    Relevant Medications    phentermine 37.5 mg capsule     Other Visit Diagnoses         Stress reaction    -  Primary          I agree overall she is doing okay I do not think any medication needed with the 1 year anniversary of her death of her dad coming about//she is very busy with her lifestyle work and helping her mom a lot      Given that the patient is not getting adequate outcome with the highest dose of semaglutide 2.2 mg, we will get her back on Adipex and plan to slowly wean off semaglutide by going every other week. She agrees to the plan.     Prescribed Adipex 37.5 mg today.   She can continue the medication as long as she keeps on losing weight.     Follow up in 3 months or sooner if necessary.     If anything worsens or changes please call us at once, follow up in the office as planned,       Scribe Attestation  By signing my name below, ILiliya Scribe   attest that this documentation has been prepared under the direction and in the presence of Errol Major DO.    All medical record entries made by the Scribe were at my direction and personally dictated by me.   I have reviewed the chart and agree that the record accurately reflects my personal performance of the history, physical exam, discussion, and plan.         [1]   Allergies  Allergen Reactions    Lavender (Lavandula Angustifolia) Rash

## 2025-05-16 ENCOUNTER — OFFICE VISIT (OUTPATIENT)
Dept: PRIMARY CARE | Facility: CLINIC | Age: 30
End: 2025-05-16
Payer: COMMERCIAL

## 2025-05-16 VITALS
SYSTOLIC BLOOD PRESSURE: 110 MMHG | WEIGHT: 174 LBS | RESPIRATION RATE: 18 BRPM | HEIGHT: 62 IN | DIASTOLIC BLOOD PRESSURE: 70 MMHG | BODY MASS INDEX: 32.02 KG/M2

## 2025-05-16 DIAGNOSIS — F43.0 STRESS REACTION: Primary | ICD-10-CM

## 2025-05-16 DIAGNOSIS — E66.09 CLASS 1 OBESITY DUE TO EXCESS CALORIES WITHOUT SERIOUS COMORBIDITY WITH BODY MASS INDEX (BMI) OF 31.0 TO 31.9 IN ADULT: ICD-10-CM

## 2025-05-16 DIAGNOSIS — E66.811 CLASS 1 OBESITY DUE TO EXCESS CALORIES WITHOUT SERIOUS COMORBIDITY WITH BODY MASS INDEX (BMI) OF 31.0 TO 31.9 IN ADULT: ICD-10-CM

## 2025-05-16 RX ORDER — PHENTERMINE HYDROCHLORIDE 37.5 MG/1
37.5 CAPSULE ORAL
Qty: 30 CAPSULE | Refills: 2 | Status: SHIPPED | OUTPATIENT
Start: 2025-05-16 | End: 2025-06-15

## 2025-07-03 ENCOUNTER — OFFICE VISIT (OUTPATIENT)
Dept: PRIMARY CARE | Facility: CLINIC | Age: 30
End: 2025-07-03

## 2025-07-03 VITALS
WEIGHT: 179 LBS | DIASTOLIC BLOOD PRESSURE: 80 MMHG | TEMPERATURE: 97.8 F | BODY MASS INDEX: 30.56 KG/M2 | HEART RATE: 93 BPM | OXYGEN SATURATION: 98 % | HEIGHT: 64 IN | SYSTOLIC BLOOD PRESSURE: 126 MMHG

## 2025-07-03 DIAGNOSIS — J02.9 SORE THROAT: ICD-10-CM

## 2025-07-03 DIAGNOSIS — E66.811 CLASS 1 OBESITY DUE TO EXCESS CALORIES WITHOUT SERIOUS COMORBIDITY WITH BODY MASS INDEX (BMI) OF 30.0 TO 30.9 IN ADULT: ICD-10-CM

## 2025-07-03 DIAGNOSIS — J02.9 PHARYNGITIS, UNSPECIFIED ETIOLOGY: Primary | ICD-10-CM

## 2025-07-03 DIAGNOSIS — E66.09 CLASS 1 OBESITY DUE TO EXCESS CALORIES WITHOUT SERIOUS COMORBIDITY WITH BODY MASS INDEX (BMI) OF 30.0 TO 30.9 IN ADULT: ICD-10-CM

## 2025-07-03 DIAGNOSIS — B00.1 COLD SORE: ICD-10-CM

## 2025-07-03 PROBLEM — R13.10 PAINFUL SWALLOWING: Status: RESOLVED | Noted: 2023-04-01 | Resolved: 2025-07-03

## 2025-07-03 PROBLEM — Z76.89 ENCOUNTER FOR WEIGHT MANAGEMENT: Status: RESOLVED | Noted: 2024-07-25 | Resolved: 2025-07-03

## 2025-07-03 PROBLEM — J03.00 STREPTOCOCCAL TONSILLOPHARYNGITIS: Status: RESOLVED | Noted: 2023-04-01 | Resolved: 2025-07-03

## 2025-07-03 PROBLEM — R63.4 WEIGHT LOSS: Status: RESOLVED | Noted: 2023-04-01 | Resolved: 2025-07-03

## 2025-07-03 PROCEDURE — 3008F BODY MASS INDEX DOCD: CPT | Performed by: NURSE PRACTITIONER

## 2025-07-03 PROCEDURE — 99213 OFFICE O/P EST LOW 20 MIN: CPT | Performed by: NURSE PRACTITIONER

## 2025-07-03 RX ORDER — VALACYCLOVIR HYDROCHLORIDE 500 MG/1
500 TABLET, FILM COATED ORAL 2 TIMES DAILY
Qty: 20 TABLET | Refills: 3 | Status: SHIPPED | OUTPATIENT
Start: 2025-07-03 | End: 2025-07-13

## 2025-07-03 RX ORDER — CEFDINIR 300 MG/1
300 CAPSULE ORAL 2 TIMES DAILY
Qty: 14 CAPSULE | Refills: 0 | Status: SHIPPED | OUTPATIENT
Start: 2025-07-03 | End: 2025-07-10

## 2025-07-03 ASSESSMENT — ENCOUNTER SYMPTOMS
CHEST TIGHTNESS: 0
NAUSEA: 0
DIARRHEA: 0
ARTHRALGIAS: 0
EYE DISCHARGE: 0
TROUBLE SWALLOWING: 0
VOICE CHANGE: 1
VOMITING: 0
SINUS PRESSURE: 0
ABDOMINAL PAIN: 0
MYALGIAS: 0
HEADACHES: 0
SINUS PAIN: 0
APPETITE CHANGE: 0
DIAPHORESIS: 0
CHILLS: 0
PALPITATIONS: 0
FEVER: 0
ACTIVITY CHANGE: 0
EYE REDNESS: 0
RHINORRHEA: 0
SORE THROAT: 1
CONSTIPATION: 0
COUGH: 1
WHEEZING: 0
NECK STIFFNESS: 0
FATIGUE: 0
SHORTNESS OF BREATH: 0

## 2025-07-03 NOTE — PROGRESS NOTES
"Subjective   Patient ID: Latia Anthony is a 29 y.o. female who presents for Sore Throat.     HPI entered by Medical Assistant and reviewed/updated by myself. The patient's allergies, medications, and history were reviewed with them today and updated as indicated.    Patient presets in office for possible strep. Ongoing x 5 days. Symptoms include sore throat on and off, hurts to talk and swallow, earache, sore on her tongue dry cough. Difficult to eat due to pain. Has tried leftover amoxicillin, has done two rounds and OTC cold medication. Went to [facility] last weekend.    Review of Systems   Constitutional:  Negative for activity change, appetite change, chills, diaphoresis, fatigue and fever.   HENT:  Positive for ear pain, sore throat and voice change. Negative for congestion, ear discharge, postnasal drip, rhinorrhea, sinus pressure, sinus pain, sneezing and trouble swallowing.    Eyes:  Negative for discharge and redness.   Respiratory:  Positive for cough. Negative for chest tightness, shortness of breath and wheezing.    Cardiovascular:  Negative for chest pain, palpitations and leg swelling.   Gastrointestinal:  Negative for abdominal pain, constipation, diarrhea, nausea and vomiting.   Musculoskeletal:  Negative for arthralgias, myalgias and neck stiffness.   Skin:  Negative for rash.   Neurological:  Negative for headaches.      Objective   Vital Signs: /80 (BP Location: Right arm, Patient Position: Sitting, BP Cuff Size: Adult long)   Pulse 93   Temp 36.6 °C (97.8 °F) (Temporal)   Ht 1.626 m (5' 4\")   Wt 81.2 kg (179 lb)   SpO2 98%   BMI 30.73 kg/m²     Physical ExamPOCT today: No results found for this visit on 07/03/25.     Assessment & Plan  Diagnoses and all orders for this visit:  Pharyngitis, unspecified etiology  -     cefdinir (Omnicef) 300 mg capsule; Take 1 capsule (300 mg) by mouth 2 times a day for 7 days.  Sore throat  -     POCT Rapid Strep A manually resulted  BMI " 30.0-30.9,adult  Class 1 obesity due to excess calories without serious comorbidity with body mass index (BMI) of 30.0 to 30.9 in adult  Cold sore  -     valACYclovir (Valtrex) 500 mg tablet; Take 1 tablet (500 mg) by mouth 2 times a day for 10 days.  Reviewed/discussed treatment options and health maintenance. Take medications as prescribed. We will contact you with the results of any ordered testing; please send a [employer] message or call the office if you do not hear from us. Follow up in the office with your PCP Errol Major, DO as already discussed/scheduled and as needed. Return sooner if symptoms do not resolve as expected.     Lauren Pina, HERNAN-CNP, DNP  Family Nurse Practitioner  [facility]   (Omnicef) 300 mg capsule; Take 1 capsule (300 mg) by mouth 2 times a day for 7 days.  -     POCT Rapid Strep A manually resulted - NEGATIVE  Continue supportive care with OTC medications as needed, rest, fluids. Let us know if symptoms persist or fail to completely resolve to baseline with current treatment. Verbalized understanding.      Cold sore  -     valACYclovir (Valtrex) 500 mg tablet; Take 1 tablet (500 mg) by mouth 2 times a day for 10 days.    BMI 30.0-30.9,adult    Reviewed/discussed treatment options and health maintenance. Take medications as prescribed. We will contact you with the results of any ordered testing; please send a [employer] message or call the office if you do not hear from us. Follow up in the office with your PCP Errol Major, DO as already discussed/scheduled and as needed. Return sooner if symptoms do not resolve as expected.     Lauren Pina, APRN-CNP, DNP  Family Nurse Practitioner  [facility]

## 2025-07-08 NOTE — PROGRESS NOTES
"Subjective   Patient ID: Latia Anthony is a 29 y.o. female who presents for Neck Pain.     HPI entered by Medical Assistant and reviewed/updated by myself. The patient's allergies, medications, and history were reviewed with them today and updated as indicated.    Neck pain   X 3 days, started suddenly when she bent over to turn on the bathtub. She had previously been carrying her 9 y/o plus a chair earlier that day.  Does radiate to the upper back   Described as a sharp pain   States she has limited ROM  Minimal use of her arms   Has difficulties moving from a sitting to standing position   Did take a muscle relaxer and home tincture. Muscle relaxer just made her sleep.  Has a h/o a cyst in her left shoulder, \"popped\" at home about 1 year ago  Unsure if the cyst has grown back or the full cyst was not cleared     Review of Systems   Constitutional:  Negative for activity change, appetite change, chills, diaphoresis, fatigue, fever and unexpected weight change.   Respiratory:  Negative for cough, chest tightness, shortness of breath and wheezing.    Cardiovascular:  Negative for chest pain, palpitations and leg swelling.   Gastrointestinal:  Negative for abdominal pain.   Musculoskeletal:  Positive for arthralgias and neck pain. Negative for back pain and myalgias.   Skin:  Negative for rash and wound.   Allergic/Immunologic: Negative for environmental allergies.   Neurological:  Negative for dizziness, syncope, facial asymmetry and headaches.   Psychiatric/Behavioral:  Negative for confusion. The patient is not nervous/anxious.       Objective   Vital Signs: /72   Pulse 76   Temp 36.2 °C (97.1 °F) (Temporal)   Ht 1.626 m (5' 4\")   Wt 82.7 kg (182 lb 6.4 oz)   SpO2 98%   BMI 31.31 kg/m²     Physical Exam  Vitals and nursing note reviewed.   Constitutional:       General: She is not in acute distress.     Appearance: Normal appearance. She is not ill-appearing.   HENT:      Head: Normocephalic and " atraumatic.      Right Ear: External ear normal.      Left Ear: External ear normal.      Nose: No congestion or rhinorrhea.      Mouth/Throat:      Mouth: Mucous membranes are moist.      Pharynx: No oropharyngeal exudate or posterior oropharyngeal erythema.   Eyes:      General:         Right eye: No discharge.         Left eye: No discharge.      Extraocular Movements: Extraocular movements intact.      Conjunctiva/sclera: Conjunctivae normal.      Pupils: Pupils are equal, round, and reactive to light.   Cardiovascular:      Rate and Rhythm: Normal rate and regular rhythm.      Heart sounds: No murmur heard.  Pulmonary:      Effort: Pulmonary effort is normal. No respiratory distress.      Breath sounds: Normal breath sounds and air entry.   Abdominal:      General: Bowel sounds are normal.      Palpations: Abdomen is soft.   Musculoskeletal:      Cervical back: Spasms, torticollis and tenderness present. No swelling, erythema, signs of trauma, lacerations, bony tenderness or crepitus. Pain with movement present. Decreased range of motion.      Thoracic back: Normal.      Lumbar back: Normal.        Back:       Right lower leg: No edema.      Left lower leg: No edema.      Comments: Pain and spasm along left upper trapezius border.  Significantly reduced neck range of motion, with approximate 15 to 20 degree left and right ROM and 0 up/down ROM before onset of pain.  Slight weakness noted left hand grasp.   Skin:     General: Skin is warm and dry.      Coloration: Skin is not jaundiced.      Findings: No erythema or rash.   Neurological:      General: No focal deficit present.      Mental Status: She is alert. Mental status is at baseline.   Psychiatric:         Mood and Affect: Mood normal.         Behavior: Behavior normal.         Thought Content: Thought content normal.     POCT today: No results found for this visit on 07/09/25.     Assessment & Plan  Diagnoses and all orders for this visit:  Acute neck  "pain  Strain of left trapezius muscle, initial encounter  Sudden onset acute left trapezius pain/spasm when bending forward to turn on the bathtub after she was carrying her 8-year-old stepson \"piggyback\" while also carrying a folding chair earlier in the day.  Pain has been sharply radiating all the way down to left fingertips at times, with no numbness or tingling.  She was initially starting to improve, then worsened again after she helped to move a table yesterday.  Will give Kenalog in the office and start prednisone taper tomorrow.  Continue use of heating pad with auto-off timer to avoid skin burns.  No heavy physical duty until feeling better.  No driving/operating machinery while on all TRAM and/or Robaxin.  If symptoms persist, will get XR and refer to physical therapy or chiropractor.  Verbalized understanding.  -     predniSONE (Deltasone) 10 mg tablet; Take 4 tablets (40 mg) by mouth once daily for 3 days, THEN 3 tablets (30 mg) once daily for 3 days, THEN 2 tablets (20 mg) once daily for 3 days, THEN 1 tablet (10 mg) once daily for 3 days.  -     triamcinolone acetonide (Kenalog-40) injection 80 mg  -     traMADol (Ultram) 50 mg tablet; Take 1 tablet (50 mg) by mouth every 8 hours if needed for severe pain (7 - 10) for up to 7 days.  -     methocarbamol (Robaxin) 500 mg tablet; Take 1 tablet (500 mg) by mouth 3 times a day as needed for muscle spasms.    Reviewed/discussed treatment options and health maintenance. Take medications as prescribed. We will contact you with the results of any ordered testing; please send a [employer] message or call the office if you do not hear from us. Follow up in the office with me as needed. Return sooner if symptoms do not resolve as expected.     Lauren Pina, APRN-CNP, DNP  Family Nurse Practitioner  [facility]  "

## 2025-07-09 ENCOUNTER — OFFICE VISIT (OUTPATIENT)
Dept: PRIMARY CARE | Facility: CLINIC | Age: 30
End: 2025-07-09
Payer: COMMERCIAL

## 2025-07-09 VITALS
SYSTOLIC BLOOD PRESSURE: 110 MMHG | HEIGHT: 64 IN | TEMPERATURE: 97.1 F | OXYGEN SATURATION: 98 % | BODY MASS INDEX: 31.14 KG/M2 | WEIGHT: 182.4 LBS | DIASTOLIC BLOOD PRESSURE: 72 MMHG | HEART RATE: 76 BPM

## 2025-07-09 DIAGNOSIS — M54.2 ACUTE NECK PAIN: Primary | ICD-10-CM

## 2025-07-09 DIAGNOSIS — S46.812A STRAIN OF LEFT TRAPEZIUS MUSCLE, INITIAL ENCOUNTER: ICD-10-CM

## 2025-07-09 PROCEDURE — 99213 OFFICE O/P EST LOW 20 MIN: CPT | Performed by: NURSE PRACTITIONER

## 2025-07-09 PROCEDURE — 3008F BODY MASS INDEX DOCD: CPT | Performed by: NURSE PRACTITIONER

## 2025-07-09 PROCEDURE — 1036F TOBACCO NON-USER: CPT | Performed by: NURSE PRACTITIONER

## 2025-07-09 PROCEDURE — 96372 THER/PROPH/DIAG INJ SC/IM: CPT | Performed by: NURSE PRACTITIONER

## 2025-07-09 RX ORDER — PREDNISONE 10 MG/1
TABLET ORAL
Qty: 30 TABLET | Refills: 0 | Status: SHIPPED | OUTPATIENT
Start: 2025-07-09 | End: 2025-07-21

## 2025-07-09 RX ORDER — METHOCARBAMOL 500 MG/1
500 TABLET, FILM COATED ORAL 3 TIMES DAILY PRN
Qty: 40 TABLET | Refills: 0 | Status: SHIPPED | OUTPATIENT
Start: 2025-07-09 | End: 2025-08-08

## 2025-07-09 RX ORDER — TRAMADOL HYDROCHLORIDE 50 MG/1
50 TABLET, FILM COATED ORAL EVERY 8 HOURS PRN
Qty: 10 TABLET | Refills: 0 | Status: SHIPPED | OUTPATIENT
Start: 2025-07-09 | End: 2025-07-16

## 2025-07-09 RX ORDER — TRIAMCINOLONE ACETONIDE 40 MG/ML
80 INJECTION, SUSPENSION INTRA-ARTICULAR; INTRAMUSCULAR ONCE
Status: COMPLETED | OUTPATIENT
Start: 2025-07-09 | End: 2025-07-09

## 2025-07-09 RX ADMIN — TRIAMCINOLONE ACETONIDE 80 MG: 40 INJECTION, SUSPENSION INTRA-ARTICULAR; INTRAMUSCULAR at 10:00

## 2025-07-09 ASSESSMENT — ENCOUNTER SYMPTOMS
WOUND: 0
FATIGUE: 0
CONFUSION: 0
COUGH: 0
FEVER: 0
UNEXPECTED WEIGHT CHANGE: 0
PALPITATIONS: 0
ABDOMINAL PAIN: 0
SHORTNESS OF BREATH: 0
CHILLS: 0
FACIAL ASYMMETRY: 0
CHEST TIGHTNESS: 0
DIZZINESS: 0
DIAPHORESIS: 0
ACTIVITY CHANGE: 0
ARTHRALGIAS: 1
BACK PAIN: 0
NECK PAIN: 1
NERVOUS/ANXIOUS: 0
APPETITE CHANGE: 0
MYALGIAS: 0
HEADACHES: 0
WHEEZING: 0